# Patient Record
Sex: MALE | Race: WHITE
[De-identification: names, ages, dates, MRNs, and addresses within clinical notes are randomized per-mention and may not be internally consistent; named-entity substitution may affect disease eponyms.]

---

## 2017-12-31 ENCOUNTER — TRANSCRIPTION ENCOUNTER (OUTPATIENT)
Age: 57
End: 2017-12-31

## 2019-10-10 ENCOUNTER — EMERGENCY (EMERGENCY)
Facility: HOSPITAL | Age: 59
LOS: 0 days | Discharge: HOME | End: 2019-10-11
Attending: EMERGENCY MEDICINE | Admitting: EMERGENCY MEDICINE
Payer: MEDICARE

## 2019-10-10 VITALS
HEIGHT: 71 IN | WEIGHT: 279.99 LBS | SYSTOLIC BLOOD PRESSURE: 192 MMHG | DIASTOLIC BLOOD PRESSURE: 98 MMHG | TEMPERATURE: 97 F | RESPIRATION RATE: 18 BRPM | OXYGEN SATURATION: 99 % | HEART RATE: 76 BPM

## 2019-10-10 DIAGNOSIS — I10 ESSENTIAL (PRIMARY) HYPERTENSION: ICD-10-CM

## 2019-10-10 DIAGNOSIS — K21.9 GASTRO-ESOPHAGEAL REFLUX DISEASE WITHOUT ESOPHAGITIS: ICD-10-CM

## 2019-10-10 DIAGNOSIS — R79.89 OTHER SPECIFIED ABNORMAL FINDINGS OF BLOOD CHEMISTRY: ICD-10-CM

## 2019-10-10 DIAGNOSIS — R07.89 OTHER CHEST PAIN: ICD-10-CM

## 2019-10-10 DIAGNOSIS — E78.5 HYPERLIPIDEMIA, UNSPECIFIED: ICD-10-CM

## 2019-10-10 DIAGNOSIS — R10.11 RIGHT UPPER QUADRANT PAIN: ICD-10-CM

## 2019-10-10 DIAGNOSIS — R61 GENERALIZED HYPERHIDROSIS: ICD-10-CM

## 2019-10-10 DIAGNOSIS — R07.9 CHEST PAIN, UNSPECIFIED: ICD-10-CM

## 2019-10-10 LAB
ALBUMIN SERPL ELPH-MCNC: 4.7 G/DL — SIGNIFICANT CHANGE UP (ref 3.5–5.2)
ALP SERPL-CCNC: 85 U/L — SIGNIFICANT CHANGE UP (ref 30–115)
ALT FLD-CCNC: 178 U/L — HIGH (ref 0–41)
ANION GAP SERPL CALC-SCNC: 15 MMOL/L — HIGH (ref 7–14)
AST SERPL-CCNC: 100 U/L — HIGH (ref 0–41)
BILIRUB SERPL-MCNC: 0.6 MG/DL — SIGNIFICANT CHANGE UP (ref 0.2–1.2)
BUN SERPL-MCNC: 14 MG/DL — SIGNIFICANT CHANGE UP (ref 10–20)
CALCIUM SERPL-MCNC: 9.9 MG/DL — SIGNIFICANT CHANGE UP (ref 8.5–10.1)
CHLORIDE SERPL-SCNC: 102 MMOL/L — SIGNIFICANT CHANGE UP (ref 98–110)
CO2 SERPL-SCNC: 24 MMOL/L — SIGNIFICANT CHANGE UP (ref 17–32)
CREAT SERPL-MCNC: 1 MG/DL — SIGNIFICANT CHANGE UP (ref 0.7–1.5)
GLUCOSE SERPL-MCNC: 92 MG/DL — SIGNIFICANT CHANGE UP (ref 70–99)
HCT VFR BLD CALC: 41.6 % — LOW (ref 42–52)
HGB BLD-MCNC: 14.2 G/DL — SIGNIFICANT CHANGE UP (ref 14–18)
LIDOCAIN IGE QN: 47 U/L — SIGNIFICANT CHANGE UP (ref 7–60)
MCHC RBC-ENTMCNC: 34.1 G/DL — SIGNIFICANT CHANGE UP (ref 32–37)
MCHC RBC-ENTMCNC: 34.1 PG — HIGH (ref 27–31)
MCV RBC AUTO: 100 FL — HIGH (ref 80–94)
NRBC # BLD: 0 /100 WBCS — SIGNIFICANT CHANGE UP (ref 0–0)
PLATELET # BLD AUTO: 188 K/UL — SIGNIFICANT CHANGE UP (ref 130–400)
POTASSIUM SERPL-MCNC: 5.1 MMOL/L — HIGH (ref 3.5–5)
POTASSIUM SERPL-SCNC: 5.1 MMOL/L — HIGH (ref 3.5–5)
PROT SERPL-MCNC: 7.7 G/DL — SIGNIFICANT CHANGE UP (ref 6–8)
RBC # BLD: 4.16 M/UL — LOW (ref 4.7–6.1)
RBC # FLD: 12.7 % — SIGNIFICANT CHANGE UP (ref 11.5–14.5)
SODIUM SERPL-SCNC: 141 MMOL/L — SIGNIFICANT CHANGE UP (ref 135–146)
TROPONIN T SERPL-MCNC: <0.01 NG/ML — SIGNIFICANT CHANGE UP
TROPONIN T SERPL-MCNC: <0.01 NG/ML — SIGNIFICANT CHANGE UP
WBC # BLD: 8.56 K/UL — SIGNIFICANT CHANGE UP (ref 4.8–10.8)
WBC # FLD AUTO: 8.56 K/UL — SIGNIFICANT CHANGE UP (ref 4.8–10.8)

## 2019-10-10 PROCEDURE — 99285 EMERGENCY DEPT VISIT HI MDM: CPT

## 2019-10-10 PROCEDURE — 93010 ELECTROCARDIOGRAM REPORT: CPT | Mod: 76

## 2019-10-10 PROCEDURE — 76705 ECHO EXAM OF ABDOMEN: CPT | Mod: 26

## 2019-10-10 PROCEDURE — 71046 X-RAY EXAM CHEST 2 VIEWS: CPT | Mod: 26

## 2019-10-10 RX ORDER — ASPIRIN/CALCIUM CARB/MAGNESIUM 324 MG
81 TABLET ORAL ONCE
Refills: 0 | Status: COMPLETED | OUTPATIENT
Start: 2019-10-10 | End: 2019-10-10

## 2019-10-10 RX ORDER — PANTOPRAZOLE SODIUM 20 MG/1
40 TABLET, DELAYED RELEASE ORAL ONCE
Refills: 0 | Status: COMPLETED | OUTPATIENT
Start: 2019-10-10 | End: 2019-10-10

## 2019-10-10 RX ORDER — FAMOTIDINE 10 MG/ML
20 INJECTION INTRAVENOUS ONCE
Refills: 0 | Status: COMPLETED | OUTPATIENT
Start: 2019-10-10 | End: 2019-10-10

## 2019-10-10 RX ADMIN — Medication 81 MILLIGRAM(S): at 18:55

## 2019-10-10 RX ADMIN — PANTOPRAZOLE SODIUM 40 MILLIGRAM(S): 20 TABLET, DELAYED RELEASE ORAL at 21:02

## 2019-10-10 RX ADMIN — FAMOTIDINE 20 MILLIGRAM(S): 10 INJECTION INTRAVENOUS at 20:34

## 2019-10-10 NOTE — ED PROVIDER NOTE - OBJECTIVE STATEMENT
58 yold male to Ed Pmhx Htn, Hld c/o moderate chest pain started this afternoon described as tightness lasting for few hrs with mild diaphoreisis; pt denies n/v/sob, neck or back pain; pt's last stress test 3 yrs ago - inconclusive due to elevated bp; pt did not f/u for futher testing; pt denies smoking, dm, fmhx cad; deneis recent travel

## 2019-10-10 NOTE — ED PROVIDER NOTE - PHYSICAL EXAMINATION
Constitutional: Well developed, well nourished. NAD  Head: Normocephalic, atraumatic.  Eyes: PERRL, EOMI.  ENT: No nasal discharge. Mucous membranes dry.  Neck: Supple. Painless ROM.  Cardiovascular: Normal S1, S2. Regular rate and rhythm.   Pulmonary: . Lungs clear to auscultation bilaterally.   Abdominal: Soft. Nondistended. No rebound, guarding, rigidity.  Extremities. Pelvis stable. No lower extremity edema, symmetric calves.  Skin: No rashes, cyanosis.  Neuro: AAOx3. No focal neurological deficits.  Psych: Normal mood. Normal affect.

## 2019-10-10 NOTE — ED CDU PROVIDER INITIAL DAY NOTE - ATTENDING CONTRIBUTION TO CARE
59 yo M gerd, htn, etoh abuse daily, previously seen by dr barahona, cardiology, now with R sided cp assoc with diaph. no sob, he also has intermittent RUQ pain.  US was done in the ED - there's a right hepatic lobe mass, no gallstones. pancreas nl.  labs reviewed. pt is daily drinker.  vss, nontoxic, well appearing, pink conj, anicteric, MMM, neck supple, CTAB, RRR, equal radial pulses bilat, abd soft/nt/nd, no cva tend. no calves tend, no edema, no fnd. no rashes.

## 2019-10-10 NOTE — ED CDU PROVIDER INITIAL DAY NOTE - GASTROINTESTINAL NEGATIVE STATEMENT, MLM
no abdominal pain, no bloating, no constipation, no diarrhea, no nausea and no vomiting. + abdominal pain, no bloating, no constipation, no diarrhea, no nausea and no vomiting.

## 2019-10-10 NOTE — ED PROVIDER NOTE - CLINICAL SUMMARY MEDICAL DECISION MAKING FREE TEXT BOX
Pt here for CP. Concerned for ACS so sent labs, images, ASA. Pt with elevated LFTs requiring US. Will be placed in EDOU for further ACS workup.

## 2019-10-10 NOTE — ED PROVIDER NOTE - CARE PLAN
Principal Discharge DX:	Chest pain Principal Discharge DX:	Chest pain  Secondary Diagnosis:	Elevated LFTs

## 2019-10-10 NOTE — ED CDU PROVIDER INITIAL DAY NOTE - OBJECTIVE STATEMENT
57y/o male with pmh of gerd, htn, hld, pt. c/o right sided cp associated with diaphoresis which happened at rest earlier today. now pain free. denies fever, chills, cough, nausea, vomiting, sob, abdominal pain. not a smoker. cardiologist is dr. Aly. 59y/o male with pmh of gerd, htn, hld, pt. c/o right sided cp associated with diaphoresis which happened at rest earlier today. now pain free. denies fever, chills, cough, nausea, vomiting, sob, + intermittent ruq abdominal pain. not a smoker. cardiologist is dr. Aly.

## 2019-10-10 NOTE — ED PROVIDER NOTE - ATTENDING CONTRIBUTION TO CARE
59 yo m with pmh of htn, hld, presents with c/o chest pain since 2pm.  pt described as tightness.  +diaphoresis, no n/v.  no radiation.  pt had treadmill stress test 3 yrs ago, but was aborted due to high bp with dr. shah.  according to pt, he was cleared at the time despite incomplete stress test.  no fever, no chills.  exam: nad, ncat, perrl, eomi, mmm, rrr, ctab, abd soft, mildly ttp ruq and epig, no rebound, no guarding, obese, aox3 imp: pt with chest pain, ekg, cxr, labs, US for mildly elevated lfts and abd tenderness, agreeable to obs status

## 2019-10-11 VITALS
TEMPERATURE: 96 F | OXYGEN SATURATION: 99 % | RESPIRATION RATE: 18 BRPM | SYSTOLIC BLOOD PRESSURE: 175 MMHG | DIASTOLIC BLOOD PRESSURE: 94 MMHG

## 2019-10-11 PROCEDURE — 99218: CPT

## 2019-10-11 PROCEDURE — 93018 CV STRESS TEST I&R ONLY: CPT

## 2019-10-11 PROCEDURE — 93016 CV STRESS TEST SUPVJ ONLY: CPT

## 2019-10-11 PROCEDURE — 78452 HT MUSCLE IMAGE SPECT MULT: CPT | Mod: 26

## 2019-10-11 NOTE — ED ADULT NURSE REASSESSMENT NOTE - NS ED NURSE REASSESS COMMENT FT1
Pt assessed A/O times 45 Vs stable on cardiac monitor denies chest pain no SOB no N/V no dizziness ambulate steady ,pt is seen evaluate by Ed attending waiting for NM stress test ,on going nursing observation .

## 2019-10-11 NOTE — ED CDU PROVIDER DISPOSITION NOTE - CARE PROVIDER_API CALL
Shashi Newberry)  Cardiovascular Disease  [address] [place] [address]  Phone: [phone]  Fax: [phone]  Follow Up Time:

## 2019-10-11 NOTE — ED CDU PROVIDER DISPOSITION NOTE - PATIENT PORTAL LINK FT
You can access the FollowMyHealth Patient Portal offered by [facility] by registering at the following website: [email or web address]. By joining [facility]’s FollowMyHealth portal, you will also be able to view your health information using other applications (apps) compatible with our system.

## 2019-10-11 NOTE — ED CDU PROVIDER SUBSEQUENT DAY NOTE - PROGRESS NOTE DETAILS
pt. in no distress, remains on cardiac monitor, trops wnl, will continue to reassess. Pt seen resting comfortably at bedside. Currently denies any pain. neg enzymes/ekg x 2. Awaiting nuclear stress test

## 2019-10-11 NOTE — ED ADULT NURSE REASSESSMENT NOTE - NS ED NURSE REASSESS COMMENT FT1
Pt reassessed remain comfortable no pain, no SOB ,no N/V cleaned dry , VS stable  is seen evaluate by Ed attending clear to be D/C to nursing home ready to be D/C with EMS  NAD noted .

## 2019-10-11 NOTE — ED CDU PROVIDER DISPOSITION NOTE - CLINICAL COURSE
Patient observed in EDOU.  no ectopy on the monitor. CEx2 negative. EKG x2 no ischemia. Stress test normal. Will dc with f/u.

## 2019-12-03 ENCOUNTER — TRANSCRIPTION ENCOUNTER (OUTPATIENT)
Age: 59
End: 2019-12-03

## 2020-07-26 ENCOUNTER — TRANSCRIPTION ENCOUNTER (OUTPATIENT)
Age: 60
End: 2020-07-26

## 2020-12-10 ENCOUNTER — TRANSCRIPTION ENCOUNTER (OUTPATIENT)
Age: 60
End: 2020-12-10

## 2021-09-18 ENCOUNTER — TRANSCRIPTION ENCOUNTER (OUTPATIENT)
Age: 61
End: 2021-09-18

## 2021-10-07 ENCOUNTER — TRANSCRIPTION ENCOUNTER (OUTPATIENT)
Age: 61
End: 2021-10-07

## 2021-11-15 ENCOUNTER — TRANSCRIPTION ENCOUNTER (OUTPATIENT)
Age: 61
End: 2021-11-15

## 2022-03-10 ENCOUNTER — TRANSCRIPTION ENCOUNTER (OUTPATIENT)
Age: 62
End: 2022-03-10

## 2022-06-21 ENCOUNTER — NON-APPOINTMENT (OUTPATIENT)
Age: 62
End: 2022-06-21

## 2022-06-23 PROBLEM — I10 ESSENTIAL (PRIMARY) HYPERTENSION: Chronic | Status: ACTIVE | Noted: 2019-10-10

## 2022-08-04 ENCOUNTER — APPOINTMENT (OUTPATIENT)
Dept: ORTHOPEDIC SURGERY | Facility: CLINIC | Age: 62
End: 2022-08-04

## 2022-08-11 ENCOUNTER — NON-APPOINTMENT (OUTPATIENT)
Age: 62
End: 2022-08-11

## 2022-10-18 ENCOUNTER — NON-APPOINTMENT (OUTPATIENT)
Age: 62
End: 2022-10-18

## 2022-10-31 ENCOUNTER — NON-APPOINTMENT (OUTPATIENT)
Age: 62
End: 2022-10-31

## 2022-11-14 ENCOUNTER — NON-APPOINTMENT (OUTPATIENT)
Age: 62
End: 2022-11-14

## 2022-11-29 ENCOUNTER — NON-APPOINTMENT (OUTPATIENT)
Age: 62
End: 2022-11-29

## 2023-01-04 ENCOUNTER — EMERGENCY (EMERGENCY)
Facility: HOSPITAL | Age: 63
LOS: 0 days | Discharge: HOME | End: 2023-01-05
Admitting: EMERGENCY MEDICINE

## 2023-01-04 ENCOUNTER — INPATIENT (INPATIENT)
Facility: HOSPITAL | Age: 63
LOS: 0 days | Discharge: HOME | End: 2023-01-05
Attending: HOSPITALIST | Admitting: HOSPITALIST
Payer: MEDICARE

## 2023-01-04 VITALS
WEIGHT: 289.91 LBS | RESPIRATION RATE: 16 BRPM | SYSTOLIC BLOOD PRESSURE: 176 MMHG | HEART RATE: 66 BPM | OXYGEN SATURATION: 99 % | DIASTOLIC BLOOD PRESSURE: 84 MMHG | TEMPERATURE: 97 F

## 2023-01-04 DIAGNOSIS — R00.2 PALPITATIONS: ICD-10-CM

## 2023-01-04 DIAGNOSIS — R06.02 SHORTNESS OF BREATH: ICD-10-CM

## 2023-01-04 DIAGNOSIS — I10 ESSENTIAL (PRIMARY) HYPERTENSION: ICD-10-CM

## 2023-01-04 DIAGNOSIS — Z20.822 CONTACT WITH AND (SUSPECTED) EXPOSURE TO COVID-19: ICD-10-CM

## 2023-01-04 DIAGNOSIS — K21.9 GASTRO-ESOPHAGEAL REFLUX DISEASE WITHOUT ESOPHAGITIS: ICD-10-CM

## 2023-01-04 DIAGNOSIS — R07.9 CHEST PAIN, UNSPECIFIED: ICD-10-CM

## 2023-01-04 DIAGNOSIS — I16.1 HYPERTENSIVE EMERGENCY: ICD-10-CM

## 2023-01-04 DIAGNOSIS — I25.10 ATHEROSCLEROTIC HEART DISEASE OF NATIVE CORONARY ARTERY WITHOUT ANGINA PECTORIS: ICD-10-CM

## 2023-01-04 DIAGNOSIS — R07.89 OTHER CHEST PAIN: ICD-10-CM

## 2023-01-04 DIAGNOSIS — I25.118 ATHEROSCLEROTIC HEART DISEASE OF NATIVE CORONARY ARTERY WITH OTHER FORMS OF ANGINA PECTORIS: ICD-10-CM

## 2023-01-04 DIAGNOSIS — R60.0 LOCALIZED EDEMA: ICD-10-CM

## 2023-01-04 LAB
ALBUMIN SERPL ELPH-MCNC: 4.7 G/DL — SIGNIFICANT CHANGE UP (ref 3.5–5.2)
ALP SERPL-CCNC: 94 U/L — SIGNIFICANT CHANGE UP (ref 30–115)
ALT FLD-CCNC: 86 U/L — HIGH (ref 0–41)
ANION GAP SERPL CALC-SCNC: 14 MMOL/L — SIGNIFICANT CHANGE UP (ref 7–14)
AST SERPL-CCNC: 59 U/L — HIGH (ref 0–41)
BASOPHILS # BLD AUTO: 0.06 K/UL — SIGNIFICANT CHANGE UP (ref 0–0.2)
BASOPHILS NFR BLD AUTO: 0.6 % — SIGNIFICANT CHANGE UP (ref 0–1)
BILIRUB SERPL-MCNC: 0.7 MG/DL — SIGNIFICANT CHANGE UP (ref 0.2–1.2)
BUN SERPL-MCNC: 14 MG/DL — SIGNIFICANT CHANGE UP (ref 10–20)
CALCIUM SERPL-MCNC: 9.9 MG/DL — SIGNIFICANT CHANGE UP (ref 8.4–10.5)
CHLORIDE SERPL-SCNC: 106 MMOL/L — SIGNIFICANT CHANGE UP (ref 98–110)
CO2 SERPL-SCNC: 23 MMOL/L — SIGNIFICANT CHANGE UP (ref 17–32)
CREAT SERPL-MCNC: 1 MG/DL — SIGNIFICANT CHANGE UP (ref 0.7–1.5)
EGFR: 85 ML/MIN/1.73M2 — SIGNIFICANT CHANGE UP
EOSINOPHIL # BLD AUTO: 0.22 K/UL — SIGNIFICANT CHANGE UP (ref 0–0.7)
EOSINOPHIL NFR BLD AUTO: 2.3 % — SIGNIFICANT CHANGE UP (ref 0–8)
GLUCOSE SERPL-MCNC: 98 MG/DL — SIGNIFICANT CHANGE UP (ref 70–99)
HCT VFR BLD CALC: 44.5 % — SIGNIFICANT CHANGE UP (ref 42–52)
HGB BLD-MCNC: 15.5 G/DL — SIGNIFICANT CHANGE UP (ref 14–18)
IMM GRANULOCYTES NFR BLD AUTO: 0.3 % — SIGNIFICANT CHANGE UP (ref 0.1–0.3)
LYMPHOCYTES # BLD AUTO: 1.53 K/UL — SIGNIFICANT CHANGE UP (ref 1.2–3.4)
LYMPHOCYTES # BLD AUTO: 16.1 % — LOW (ref 20.5–51.1)
MAGNESIUM SERPL-MCNC: 1.7 MG/DL — LOW (ref 1.8–2.4)
MCHC RBC-ENTMCNC: 34.5 PG — HIGH (ref 27–31)
MCHC RBC-ENTMCNC: 34.8 G/DL — SIGNIFICANT CHANGE UP (ref 32–37)
MCV RBC AUTO: 99.1 FL — HIGH (ref 80–94)
MONOCYTES # BLD AUTO: 0.98 K/UL — HIGH (ref 0.1–0.6)
MONOCYTES NFR BLD AUTO: 10.3 % — HIGH (ref 1.7–9.3)
NEUTROPHILS # BLD AUTO: 6.67 K/UL — HIGH (ref 1.4–6.5)
NEUTROPHILS NFR BLD AUTO: 70.4 % — SIGNIFICANT CHANGE UP (ref 42.2–75.2)
NRBC # BLD: 0 /100 WBCS — SIGNIFICANT CHANGE UP (ref 0–0)
NT-PROBNP SERPL-SCNC: 32 PG/ML — SIGNIFICANT CHANGE UP (ref 0–300)
PLATELET # BLD AUTO: 191 K/UL — SIGNIFICANT CHANGE UP (ref 130–400)
POTASSIUM SERPL-MCNC: 4.6 MMOL/L — SIGNIFICANT CHANGE UP (ref 3.5–5)
POTASSIUM SERPL-SCNC: 4.6 MMOL/L — SIGNIFICANT CHANGE UP (ref 3.5–5)
PROT SERPL-MCNC: 7.7 G/DL — SIGNIFICANT CHANGE UP (ref 6–8)
RBC # BLD: 4.49 M/UL — LOW (ref 4.7–6.1)
RBC # FLD: 12.7 % — SIGNIFICANT CHANGE UP (ref 11.5–14.5)
SARS-COV-2 RNA SPEC QL NAA+PROBE: SIGNIFICANT CHANGE UP
SODIUM SERPL-SCNC: 143 MMOL/L — SIGNIFICANT CHANGE UP (ref 135–146)
TROPONIN T SERPL-MCNC: <0.01 NG/ML — SIGNIFICANT CHANGE UP
TROPONIN T SERPL-MCNC: <0.01 NG/ML — SIGNIFICANT CHANGE UP
WBC # BLD: 9.49 K/UL — SIGNIFICANT CHANGE UP (ref 4.8–10.8)
WBC # FLD AUTO: 9.49 K/UL — SIGNIFICANT CHANGE UP (ref 4.8–10.8)

## 2023-01-04 PROCEDURE — 99223 1ST HOSP IP/OBS HIGH 75: CPT | Mod: FS,CS

## 2023-01-04 PROCEDURE — 71045 X-RAY EXAM CHEST 1 VIEW: CPT | Mod: 26

## 2023-01-04 PROCEDURE — 93010 ELECTROCARDIOGRAM REPORT: CPT | Mod: 76

## 2023-01-04 RX ORDER — REGADENOSON 0.08 MG/ML
0.4 INJECTION, SOLUTION INTRAVENOUS ONCE
Refills: 0 | Status: DISCONTINUED | OUTPATIENT
Start: 2023-01-04 | End: 2023-01-04

## 2023-01-04 RX ORDER — VALSARTAN 80 MG/1
320 TABLET ORAL DAILY
Refills: 0 | Status: DISCONTINUED | OUTPATIENT
Start: 2023-01-04 | End: 2023-01-05

## 2023-01-04 RX ORDER — MAGNESIUM SULFATE 500 MG/ML
2 VIAL (ML) INJECTION ONCE
Refills: 0 | Status: COMPLETED | OUTPATIENT
Start: 2023-01-04 | End: 2023-01-04

## 2023-01-04 RX ORDER — ATENOLOL 25 MG/1
50 TABLET ORAL DAILY
Refills: 0 | Status: DISCONTINUED | OUTPATIENT
Start: 2023-01-04 | End: 2023-01-05

## 2023-01-04 RX ADMIN — Medication 25 GRAM(S): at 18:02

## 2023-01-04 RX ADMIN — Medication 2 GRAM(S): at 19:02

## 2023-01-04 NOTE — ED PROVIDER NOTE - CLINICAL SUMMARY MEDICAL DECISION MAKING FREE TEXT BOX
62-year-old male with h/o HTN, GERD, in ER with c/o episode of chest tightness, SOB, diaphoresis -now resolved.  EKG with no acute ischemic changes.  Troponin negative.  Patient placed in EDOU for further cardiac evaluation.

## 2023-01-04 NOTE — ED PROCEDURE NOTE - NS ED ATTENDING STATEMENT MOD
This was a shared visit with the CÉSAR. I reviewed and verified the documentation and independently performed the documented:

## 2023-01-04 NOTE — ED ADULT NURSE REASSESSMENT NOTE - NS ED NURSE REASSESS COMMENT FT1
pt placed in OBS, denies pain/discomfort at this time. VSS, pt awaiting stress test in AM, will cont to monitor.

## 2023-01-04 NOTE — ED PROVIDER NOTE - PHYSICAL EXAMINATION
CONSTITUTIONAL: Well-developed; well-nourished; in no acute distress.   SKIN: Warm, dry  HEAD: Normocephalic; atraumatic  EYES: PERRL, EOMI, normal sclera and conjunctiva   ENT: No nasal discharge; airway clear.  CARD:  Regular rate and rhythm. Normal S1, S2. 2+ distal pulses.  RESP: No increased WOB. CTA b/l without wheezes, crackles, rhonchi  ABD: Normoactive BS. Soft, nontender, nondistended.  EXT: Normal ROM. B/L 2+ pitting edema. Equal calf sizes, nontender.   NEURO: Alert, oriented, grossly unremarkable  PSYCH: Cooperative, appropriate.

## 2023-01-04 NOTE — ED ADULT NURSE REASSESSMENT NOTE - NS ED NURSE REASSESS COMMENT FT1
pt denies pain/discomfort at this time. IVL inact, cardiac monitor in place. Safety and comfort measures in place. will cont to monitor.

## 2023-01-04 NOTE — ED CDU PROVIDER INITIAL DAY NOTE - CLINICAL SUMMARY MEDICAL DECISION MAKING FREE TEXT BOX
61 y/o male with h/o htn, gerd, in ER with c/o CP.  Pt states he was walking up stairs and felt very winded, like his heart was racing and developed chest tightness and felt SOB.  + diaphoresis.  no n/v.   Sat down and rested and symptoms slowly subsided.  has noted some decreased exercise tolerance recently.  no cough.  no abd pain.  + b/l LE edema, states he's had this for several years and is not new.  no ha/dizziness/syncope.  has seen a card previously and was supposed to do an exercise stress test, but was unable to complete 2/2 his BP being too elevated at the time.  has not had any other cardiac testing.  non-smoker.  PE - nad, nc/at, eomi, perrl, op - clear, mmm, cta b/l, no w/r/r, rrr, abd  - soft, nt/nd, nabs, from x 4, + b/l LE edema, no calf tenderness, A&O x 3, no focal neuro deficits.  -62-year-old male with h/o HTN, GERD, in ER with c/o episode of chest tightness, SOB, diaphoresis -now resolved.  EKG with no acute ischemic changes.  Troponin negative.  Patient placed in EDOU for further cardiac evaluation.

## 2023-01-04 NOTE — ED PROVIDER NOTE - OBJECTIVE STATEMENT
61 y/o M with PMH HTN, GERD presenting for palpitations and sweating that started at approx. 10am while sitting in doctor's office with his wife (wife was the pt). A/w SOB; notes he had just walked up a flight of stairs prior to onset of symptoms. Notes he has had increasingly worsening SOB with exertion over the past few weeks.  Previously saw Francoise years ago; started stress test which was stopped for uncontrolled htn.  Nonsmoker. No family hx heart disease 63 y/o M with PMH HTN, GERD presenting for palpitations and sweating that started at approx. 10am while sitting in doctor's office with his wife (wife was the pt). A/w SOB; notes he had just walked up a flight of stairs prior to onset of symptoms. Notes he has had increasingly worsening SOB with exertion over the past few weeks. Feels better now but feels slight chest tightness. Previously saw Francoise years ago; started stress test which was stopped for uncontrolled htn. Denies back pain, N/V, abd pain, fever. Nonsmoker. No family hx heart disease. No recent travel or surgery. No hormonal meds. No familial hx of clotting disorders.

## 2023-01-04 NOTE — ED PROVIDER NOTE - ATTENDING CONTRIBUTION TO CARE
63 y/o male with h/o htn, gerd, in ER with c/o CP.  Pt states he was walking up stairs and felt very winded, like his heart was racing and developed chest tightness and felt SOB.  + diaphoresis.  no n/v.   Sat down and rested and symptoms slowly subsided.  has noted some decreased exercise tolerance recently.  no cough.  no abd pain.  + b/l LE edema, states he's had this for several years and is not new.  no ha/dizziness/syncope.  has seen a card previously and was supposed to do an exercise stress test, but was unable to complete 2/2 his BP being too elevated at the time.  has not had any other cardiac testing.  non-smoker.  PE - nad, nc/at, eomi, perrl, op - clear, mmm, cta b/l, no w/r/r, rrr, abd  - soft, nt/nd, nabs, from x 4, + b/l LE edema, no calf tenderness, A&O x 3, no focal neuro deficits.  -cardiac w/u.

## 2023-01-04 NOTE — ED CDU PROVIDER INITIAL DAY NOTE - NS ED ROS FT
CONST: No fever, chills or bodyaches  EYES: No pain, redness, drainage or visual changes.  ENT: No ear pain or discharge, nasal discharge or congestion. No sore throat  CARD: positive chest pain, palpitations  RESP: positive SOB, no cough, hemoptysis. No hx of asthma or COPD  GI: No abdominal pain, N/V/D  MS: No joint pain, back pain or extremity pain/injury  SKIN: No rashes  NEURO: No headache, dizziness, paresthesias or LOC

## 2023-01-04 NOTE — ED CDU PROVIDER INITIAL DAY NOTE - OBJECTIVE STATEMENT
61 y/o M with PMH HTN, GERD presenting for palpitations and sweating that started at approx. 10am while sitting in doctor's office with his wife (wife was the pt). A/w SOB; notes he had just walked up a flight of stairs prior to onset of symptoms. Notes he has had increasingly worsening SOB with exertion over the past few weeks. Feels better now but feels slight chest tightness. Previously saw Francoise years ago; started stress test which was stopped for uncontrolled htn. Denies back pain, N/V, abd pain, fever. Nonsmoker. No family hx heart disease. No recent travel or surgery. No hormonal meds. No familial hx of clotting disorders.

## 2023-01-05 ENCOUNTER — TRANSCRIPTION ENCOUNTER (OUTPATIENT)
Age: 63
End: 2023-01-05

## 2023-01-05 VITALS
RESPIRATION RATE: 18 BRPM | OXYGEN SATURATION: 98 % | HEART RATE: 60 BPM | DIASTOLIC BLOOD PRESSURE: 84 MMHG | SYSTOLIC BLOOD PRESSURE: 177 MMHG

## 2023-01-05 PROCEDURE — 93010 ELECTROCARDIOGRAM REPORT: CPT

## 2023-01-05 PROCEDURE — G1004: CPT

## 2023-01-05 PROCEDURE — 99238 HOSP IP/OBS DSCHRG MGMT 30/<: CPT

## 2023-01-05 PROCEDURE — 93458 L HRT ARTERY/VENTRICLE ANGIO: CPT | Mod: 26

## 2023-01-05 PROCEDURE — 99236 HOSP IP/OBS SAME DATE HI 85: CPT

## 2023-01-05 PROCEDURE — 75574 CT ANGIO HRT W/3D IMAGE: CPT | Mod: 26,MF

## 2023-01-05 PROCEDURE — 99497 ADVNCD CARE PLAN 30 MIN: CPT | Mod: 25

## 2023-01-05 RX ORDER — HEPARIN SODIUM 5000 [USP'U]/ML
5000 INJECTION INTRAVENOUS; SUBCUTANEOUS EVERY 12 HOURS
Refills: 0 | Status: DISCONTINUED | OUTPATIENT
Start: 2023-01-05 | End: 2023-01-05

## 2023-01-05 RX ORDER — ATORVASTATIN CALCIUM 80 MG/1
1 TABLET, FILM COATED ORAL
Qty: 30 | Refills: 2
Start: 2023-01-05 | End: 2023-04-04

## 2023-01-05 RX ORDER — SODIUM CHLORIDE 9 MG/ML
1000 INJECTION INTRAMUSCULAR; INTRAVENOUS; SUBCUTANEOUS
Refills: 0 | Status: DISCONTINUED | OUTPATIENT
Start: 2023-01-05 | End: 2023-01-05

## 2023-01-05 RX ORDER — SODIUM CHLORIDE 9 MG/ML
1000 INJECTION INTRAMUSCULAR; INTRAVENOUS; SUBCUTANEOUS ONCE
Refills: 0 | Status: COMPLETED | OUTPATIENT
Start: 2023-01-05 | End: 2023-01-05

## 2023-01-05 RX ORDER — PANTOPRAZOLE SODIUM 20 MG/1
40 TABLET, DELAYED RELEASE ORAL
Refills: 0 | Status: DISCONTINUED | OUTPATIENT
Start: 2023-01-05 | End: 2023-01-05

## 2023-01-05 RX ORDER — ASPIRIN/CALCIUM CARB/MAGNESIUM 324 MG
1 TABLET ORAL
Qty: 30 | Refills: 2
Start: 2023-01-05 | End: 2023-04-04

## 2023-01-05 RX ORDER — HYDRALAZINE HCL 50 MG
10 TABLET ORAL ONCE
Refills: 0 | Status: COMPLETED | OUTPATIENT
Start: 2023-01-05 | End: 2023-01-05

## 2023-01-05 RX ORDER — METOPROLOL TARTRATE 50 MG
100 TABLET ORAL ONCE
Refills: 0 | Status: COMPLETED | OUTPATIENT
Start: 2023-01-05 | End: 2023-01-05

## 2023-01-05 RX ORDER — ASPIRIN/CALCIUM CARB/MAGNESIUM 324 MG
324 TABLET ORAL ONCE
Refills: 0 | Status: COMPLETED | OUTPATIENT
Start: 2023-01-05 | End: 2023-01-05

## 2023-01-05 RX ORDER — ATORVASTATIN CALCIUM 80 MG/1
40 TABLET, FILM COATED ORAL AT BEDTIME
Refills: 0 | Status: DISCONTINUED | OUTPATIENT
Start: 2023-01-05 | End: 2023-01-05

## 2023-01-05 RX ORDER — ASPIRIN/CALCIUM CARB/MAGNESIUM 324 MG
81 TABLET ORAL DAILY
Refills: 0 | Status: DISCONTINUED | OUTPATIENT
Start: 2023-01-06 | End: 2023-01-05

## 2023-01-05 RX ADMIN — Medication 100 MILLIGRAM(S): at 06:22

## 2023-01-05 RX ADMIN — ATENOLOL 50 MILLIGRAM(S): 25 TABLET ORAL at 06:22

## 2023-01-05 RX ADMIN — SODIUM CHLORIDE 100 MILLILITER(S): 9 INJECTION INTRAMUSCULAR; INTRAVENOUS; SUBCUTANEOUS at 12:24

## 2023-01-05 RX ADMIN — VALSARTAN 320 MILLIGRAM(S): 80 TABLET ORAL at 06:22

## 2023-01-05 RX ADMIN — Medication 324 MILLIGRAM(S): at 13:07

## 2023-01-05 RX ADMIN — SODIUM CHLORIDE 1000 MILLILITER(S): 9 INJECTION INTRAMUSCULAR; INTRAVENOUS; SUBCUTANEOUS at 12:00

## 2023-01-05 RX ADMIN — Medication 10 MILLIGRAM(S): at 13:06

## 2023-01-05 NOTE — CHART NOTE - NSCHARTNOTEFT_GEN_A_CORE
PRE-OP DIAGNOSIS:  chest pain, positive CCTA    PROCEDURE:   [ ] Coronary Angiogram   [ x] LHC   [ ] LVG   [ ] RHC   [ ] Intervention (see below)       PHYSICIAN:  Dr. Roman  FELLOW: Katelin    PROCEDURE DESCRIPTION:     Consent:    [x] Patient   [] Family Member   []  Used      Anesthesia:   [ ] General   [X] Sedation   [X] Local     Access & Closure:   [ x] 6 Fr Radial Artery  -> D-stat     [ ]  Fr Femoral Artery ->  [ ]  Fr Femoral Vein ->   [ ]  Fr Brachial Vein ->     IV Contrast: 60 mL      Intervention:  none    Implants: none    FINDINGS:   Coronary Dominance: right  LM: minor irregularities  LAD: mild disease. 40% ostial D1   CX: severe distal disease. 40% OM1 stenosis  RCA: mild disease in RPDA. Minor irregularities in remainder    LVEDP:  24 mmHg     EF: not assessed    ESTIMATED BLOOD LOSS: < 10 mL      CONDITION:   [x] Good   [ ] Fair   [ ] Critical     SPECIMEN REMOVED: N/A     POST-OP DIAGNOSIS:    [ ] Normal Coronary Angiogram   [x ] Mild Coronary Artery Disease (< 50% stenosis)      PLAN OF CARE:   [x ] D/C Home Today   [x ] Return to In-patient bed   [ ] Admit for observation   [ ] Return for Staged Procedure in 4-6 weeks   [ ] CT Surgery Consult   [X] Medications: ASA,  statin  [X] IV Fluids: NS @ 100cc/h x 6 hours  [ ] EP Consult  [x] Remove D-stat and Hold manual pressure if signs of hematoma or bleeding over radial access site.  [x] Smoking cessation PRE-OP DIAGNOSIS:  chest pain, positive CCTA    PROCEDURE:   [ ] Coronary Angiogram   [ x] LHC   [ ] LVG   [ ] RHC   [ ] Intervention (see below)       PHYSICIAN:  Dr. Roman  FELLOW: Katelin    PROCEDURE DESCRIPTION:     Consent:    [x] Patient   [] Family Member   []  Used      Anesthesia:   [ ] General   [X] Sedation   [X] Local     Access & Closure:   [ x] 6 Fr Radial Artery  -> D-stat     [ ]  Fr Femoral Artery ->  [ ]  Fr Femoral Vein ->   [ ]  Fr Brachial Vein ->     IV Contrast: 60 mL      Intervention:  none    Implants: none    FINDINGS:   Coronary Dominance: right  LM: minor irregularities  LAD: mild disease. 40% ostial D1   CX: severe distal disease, small vessel. 40% OM1 stenosis  RCA: mild disease in RPDA. Minor irregularities in remainder    LVEDP:  24 mmHg     EF: not assessed    ESTIMATED BLOOD LOSS: < 10 mL      CONDITION:   [x] Good   [ ] Fair   [ ] Critical     SPECIMEN REMOVED: N/A     POST-OP DIAGNOSIS:    [ ] Normal Coronary Angiogram   [x ] Mild Coronary Artery Disease (< 50% stenosis)      PLAN OF CARE:   [x ] D/C Home Today   [x ] Return to In-patient bed   [ ] Admit for observation   [ ] Return for Staged Procedure in 4-6 weeks   [ ] CT Surgery Consult   [X] Medications: ASA,  statin  [X] IV Fluids: NS @ 100cc/h x 6 hours  [ ] EP Consult  [x] Remove D-stat and Hold manual pressure if signs of hematoma or bleeding over radial access site.  [x] Smoking cessation

## 2023-01-05 NOTE — DISCHARGE NOTE NURSING/CASE MANAGEMENT/SOCIAL WORK - NSDCPEFALRISK_GEN_ALL_CORE
For information on Fall & Injury Prevention, visit: [email or web address] OR  [email or web address] OR  https://www.cdc.gov/steadi/patient.html

## 2023-01-05 NOTE — H&P ADULT - CONVERSATION DETAILS
Goals of Care Conversation done with pt. Discussed FULL CODE VS DNR/DNI. CPR and intubation discussed with Pt and agreed to Both. Pt wishes to be FULL code.  All questions answered.

## 2023-01-05 NOTE — DISCHARGE NOTE PROVIDER - HOSPITAL COURSE
HPI:  63 y/o M with PMH HTN, GERD presenting for shortness of breath, palpitations and sweating that started while walking up a flight of stairs prior to onset of symptoms. Notes he has had increasingly worsening chest pain and SOB with exertion over the past few weeks. No current chest pain. Previously saw Francoise years ago; started stress test which was stopped for uncontrolled htn. Denies back pain, N/V, abd pain, fever. Nonsmoker. No family hx heart disease. No recent travel or surgery. No hormonal meds. No familial hx of clotting disorders. In the ED, pt hemodynamically stable, elevated /104 improved s/p metoprolol. Labs wnl Mg 1.7, Trops neg x2  (05 Jan 2023 12:05)    Patient had a CCTA which showed Diminutive left circumflex artery containing a focal area of severe stenosis proximally with nonvisualization distally, which may be due to occlusion. patient also had a cath which showed:  Coronary Dominance: right  LM: minor irregularities  LAD: mild disease. 40% ostial D1   CX: severe distal disease. 40% OM1 stenosis  RCA: mild disease in RPDA. Minor irregularities in remainder    Patient was cleared by the cardiology to be discharged   Patient will have to do a TTE as outpatient        HPI:  63 y/o M with PMH HTN, GERD presenting for shortness of breath, palpitations and sweating that started while walking up a flight of stairs prior to onset of symptoms. Notes he has had increasingly worsening chest pain and SOB with exertion over the past few weeks. No current chest pain. Previously saw Francoise years ago; started stress test which was stopped for uncontrolled htn. Denies back pain, N/V, abd pain, fever. Nonsmoker. No family hx heart disease. No recent travel or surgery. No hormonal meds. No familial hx of clotting disorders. In the ED, pt hemodynamically stable, elevated /104 improved s/p metoprolol. Labs wnl Mg 1.7, Trops neg x2  (05 Jan 2023 12:05)    Patient had a CCTA which showed Diminutive left circumflex artery containing a focal area of severe stenosis proximally with nonvisualization distally, which may be due to occlusion. patient also had a cath which showed:  Coronary Dominance: right  LM: minor irregularities  LAD: mild disease. 40% ostial D1   CX: severe distal disease. 40% OM1 stenosis  RCA: mild disease in RPDA. Minor irregularities in remainder    Patient was cleared by the cardiology to be discharged   Patient will have to do a TTE as outpatient  as per cardiology   Attending note: [facility] cardiology staff, stated that PT bp was in 150s in the cath lab, [facility]

## 2023-01-05 NOTE — DISCHARGE NOTE PROVIDER - PROVIDER TOKENS
PROVIDER:[TOKEN:[[id number]],FOLLOWUP:[1 week]],PROVIDER:[TOKEN:[[id number]],FOLLOWUP:[2 weeks]]

## 2023-01-05 NOTE — ED CDU PROVIDER DISPOSITION NOTE - CLINICAL COURSE
62-year-old male presents to the emergency department complaining of chest pain.  In the emergency department he had screening labs, imaging, cardiac biomarkers, without acute emergent findings and I disposition to ED observation unit for low risk chest pain.  In ED observation unit reevaluation, cardiac biomarkers, repeat EKGs and CCTA.  CCTA revealed high grade CAD RADS score, identified as left circumflex stenotic lesion with questionable lack of flow distally.  Patient was seen by cardiology fellow bedside, no plan for emergent intervention, suggest discussion with PMD cardiologist.  Contact made to PMD cardiologist, pending communication at this time.  Patient clinically well, will disposition to monitored setting as inpatient pending additional feedback and plan from PMD cardiologist.

## 2023-01-05 NOTE — DISCHARGE NOTE PROVIDER - NSDCCPTREATMENT_GEN_ALL_CORE_FT
PRINCIPAL PROCEDURE  Procedure: Transthoracic echocardiography (TTE)  Findings and Treatment:

## 2023-01-05 NOTE — DISCHARGE NOTE PROVIDER - ATTENDING DISCHARGE PHYSICAL EXAMINATION:
Attending attestation  Attending DC note  Pt seen and examined at bedside. No cp or sob  vitals, labs, exam stable  Hospital course as above.  Plan dw pt and agreed to plan  Medically cleared for DC. Med recc completed.  [facility] resident.

## 2023-01-05 NOTE — DISCHARGE NOTE PROVIDER - NSDCCPCAREPLAN_GEN_ALL_CORE_FT
PRINCIPAL DISCHARGE DIAGNOSIS  Diagnosis: Chest pain  Assessment and Plan of Treatment: Chest pain can be caused by many different conditions which may or may not be dangerous. Causes include heartburn, lung infections, heart attack, blood clot in lungs, skin infections, strain or damage to muscle, cartilage, or bones, etc. In addition to a history and physical examination, an electrocardiogram (ECG) or other lab tests may have been performed to determine the cause of your chest pain. Follow up with your primary care provider or with a cardiologist as instructed.   You had a catheterization done, which showed no occlusion of the arteries. Please take your medications as prescribed and followup with Dr. Benavides and with your primary care physician as instructed   SEEK IMMEDIATE MEDICAL CARE IF YOU HAVE ANY OF THE FOLLOWING SYMPTOMS: worsening chest pain, coughing up blood, unexplained back/neck/jaw pain, severe abdominal pain, dizziness or lightheadedness, fainting, shortness of breath, sweaty or clammy skin, vomiting, or racing heart beat. These symptoms may represent a serious problem that is an emergency. Do not wait to see if the symptoms will go away. Get medical help right away. Call 911 and do not drive yourself to the hospital.

## 2023-01-05 NOTE — DISCHARGE NOTE PROVIDER - NSDCMRMEDTOKEN_GEN_ALL_CORE_FT
aspirin 81 mg oral delayed release tablet: 1 tab(s) orally once a day  atenolol 50 mg oral tablet: 1 tab(s) orally once a day  atorvastatin 40 mg oral tablet: 1 tab(s) orally once a day (at bedtime)  pantoprazole 40 mg oral delayed release tablet: 1 tab(s) orally once a day  valsartan 320 mg oral tablet: 1 tab(s) orally once a day

## 2023-01-05 NOTE — H&P ADULT - ATTENDING COMMENTS
HPI as above.  Interval history: Pt seen and examined at bedside. No cp or sob.   Vital Signs (24 Hrs):  T(C): 36.8 (01-05-23 @ 00:18), Max: 36.8 (01-05-23 @ 00:18)  HR: 54 (01-05-23 @ 07:19) (54 - 73)  BP: 184/88 (01-05-23 @ 07:19) (183/94 - 208/104)  RR: 19 (01-05-23 @ 07:19) (18 - 20)  SpO2: 98% (01-05-23 @ 07:19) (97% - 99%)  Wt(kg): --  Daily     Daily     I&O's Summary    PHYSICAL EXAM:  GENERAL: NAD, well-developed  HEAD:  Atraumatic, Normocephalic  EYES: EOMI, PERRLA, conjunctiva and sclera clear  NECK: Supple, No JVD  CHEST/LUNG: Clear to auscultation bilaterally; No wheeze  HEART: Regular rate and rhythm; No murmurs, rubs, or gallops  ABDOMEN: Soft, Nontender, Nondistended; Bowel sounds present  EXTREMITIES:  2+ Peripheral Pulses, No clubbing, cyanosis, or edema  PSYCH: AAOx3  NEUROLOGY: non-focal  SKIN: No rashes or lesions  Labs reviewed  Imaging reviewed independently and reviewed read  EKG reviewed independently and reviewed read    Plan    #Progress Note Handoff  Pending (specify):    Family discussion: house staff updated pt family  Disposition:   Decision to admit the pt is based on acuity as above HPI as above.  Interval history: Pt seen and examined at bedside. No cp or sob.  BP elevated at bedside  Vital Signs (24 Hrs):  T(C): 36.8 (01-05-23 @ 00:18), Max: 36.8 (01-05-23 @ 00:18)  HR: 54 (01-05-23 @ 07:19) (54 - 73)  BP: 184/88 (01-05-23 @ 07:19) (183/94 - 208/104)  RR: 19 (01-05-23 @ 07:19) (18 - 20)  SpO2: 98% (01-05-23 @ 07:19) (97% - 99%)  Wt(kg): --  Daily     Daily     I&O's Summary    PHYSICAL EXAM:  GENERAL: NAD, well-developed  HEAD:  Atraumatic, Normocephalic  EYES: EOMI, PERRLA, conjunctiva and sclera clear  NECK: Supple, No JVD  CHEST/LUNG: Clear to auscultation bilaterally; No wheeze  HEART: Regular rate and rhythm; No murmurs, rubs, or gallops  ABDOMEN: Soft, Nontender, Nondistended; Bowel sounds present  EXTREMITIES:  2+ Peripheral Pulses, No clubbing, cyanosis, or edema  PSYCH: AAOx3  NEUROLOGY: non-focal  SKIN: No rashes or lesions  Labs reviewed  Imaging reviewed independently and reviewed read  < from: CT Angio Heart and Coronaries w/ IV Cont (01.05.23 @ 08:30) >    Diminutive left circumflex artery containing a focal area of severe   stenosis proximally with nonvisualization distally, which may be due to   occlusion.    Multiple focal areas of diminutive density calcified vessel limiting   intraluminal evaluation.    The total Agatston coronary artery calcium score equals 236, which   corresponds to 75th percentile for age, gender and ethnicity.    CAD-RADS 4A/5N.    < end of copied text >      EKG reviewed independently and reviewed read  < from: 12 Lead ECG (01.05.23 @ 12:15) >    Diagnosis Line Normal sinus rhythm  Normal ECG    < end of copied text >      Plan as above, [facility] resident in real time.  bp control. follow up cath report , Plan dw cardiology team      Decision to admit the pt is based on acuity as above

## 2023-01-05 NOTE — H&P ADULT - ASSESSMENT
#CHEST PAIN  Stable vs Unstable angina   -No hemodynamic instability  -EKG - No acute ischemic changes  -CXR - neg for acute cardiopulmonary disease  -CCTA - Diminutive left circumflex artery containing a focal area of severe stenosis proximally with nonvisualization distally, which may be due to occlusion. Multiple focal areas of diminutive density calcified vessel limiting intraluminal evaluation.  -serial troponins, trop neg x 2    -f/u TTE  -give nitroglycerin if persistent chest discomfort  -start aspirin, BB, statins  -Cath today, Keep NPO   -BP control    #GERD  start GI PPX      #DVT PPX : Heparin subq  #Diet: NPO pending cath  #GI PPX: Protonix  #Activity: IAT   61 y/o M with PMH HTN, GERD presenting for shortness of breath, palpitations and sweating that started while walking up a flight of stairs prior to onset of symptoms. Notes he has had increasingly worsening chest pain and SOB with exertion over the past few weeks. No current chest pain. Previously saw Francoise years ago; started stress test which was stopped for uncontrolled htn. Denies back pain, N/V, abd pain, fever. Nonsmoker. No family hx heart disease. No recent travel or surgery. No hormonal meds. No familial hx of clotting disorders.      #CHEST PAIN  Stable angina   -No hemodynamic instability  -EKG - No acute ischemic changes  -CXR - neg for acute cardiopulmonary disease  -CCTA - Diminutive left circumflex artery containing a focal area of severe stenosis proximally with nonvisualization distally, which may be due to occlusion. Multiple focal areas of diminutive density calcified vessel limiting intraluminal evaluation.  -serial troponins, trop neg x 2    -f/u TTE  -give nitroglycerin if persistent chest discomfort  -start aspirin, BB, statins  -Cath today, Keep NPO         #Hypertensive emergency   give hydralazine 10mg IV STAT  cont valsartan 320 and atenolol 50   needs adequate BP control    #GERD  start GI PPX      #DVT PPX : Heparin subq  #Diet: NPO pending cath  #GI PPX: Protonix  #Activity: IAT   61 y/o M with PMH HTN, GERD presenting for shortness of breath, palpitations and sweating that started while walking up a flight of stairs prior to onset of symptoms. Notes he has had increasingly worsening chest pain and SOB with exertion over the past few weeks. No current chest pain. Previously saw Francoise years ago; started stress test which was stopped for uncontrolled htn. Denies back pain, N/V, abd pain, fever. Nonsmoker. No family hx heart disease. No recent travel or surgery. No hormonal meds. No familial hx of clotting disorders.      #CHEST PAIN  Stable angina   -No hemodynamic instability  -EKG - No acute ischemic changes  -CXR - neg for acute cardiopulmonary disease  -CCTA - Diminutive left circumflex artery containing a focal area of severe stenosis proximally with nonvisualization distally, which may be due to occlusion. Multiple focal areas of diminutive density calcified vessel limiting intraluminal evaluation.  -serial troponins, trop neg x 2    -f/u TTE  -give nitroglycerin if persistent chest discomfort  -start aspirin, BB, statins  -Cath today, Keep NPO         #Hypertensive emergency   give hydralazine 10mg IV STAT  cont valsartan 320 and atenolol 50   needs adequate BP control   BP improved 200s-->170s    #GERD  start GI PPX      #DVT PPX : Heparin subq  #Diet: NPO pending cath  #GI PPX: Protonix  #Activity: IAT

## 2023-01-05 NOTE — DISCHARGE NOTE PROVIDER - CARE PROVIDER_API CALL
Shashi Newberry  CARDIOVASCULAR DISEASE  [address]  [place] [address]  Phone: [phone]  Fax: [phone]  Follow Up Time: 1 week    Padma Browne  INTERNAL MEDICINE  [address] [place] [id number]  Phone: [phone]  Fax: [phone]  Follow Up Time: 2 weeks

## 2023-01-05 NOTE — ED CDU PROVIDER SUBSEQUENT DAY NOTE - ATTENDING APP SHARED VISIT CONTRIBUTION OF CARE
62-year-old male presents to the emergency department complaining of chest discomfort.  In the emergency department he had screening labs, imaging, cardiac biomarkers without acute emergent findings or changes in status and I disposition to ED observation unit for low risk chest pain protocol.  He was reevaluated, no change in status, and had plan for cardiac imaging in the a.m.  We will continue management pending testing and completion of protocol.    I personally evaluated the patient. I reviewed the Resident´s or Physician Assistant´s note (as assigned above), and agree with the findings and plan except as documented in my note.

## 2023-01-05 NOTE — H&P ADULT - NSHPLABSRESULTS_GEN_ALL_CORE
15.5   9.49  )-----------( 191      ( 04 Jan 2023 14:22 )             44.5       01-04    143  |  106  |  14  ----------------------------<  98  4.6   |  23  |  1.0    Ca    9.9      04 Jan 2023 14:22  Mg     1.7     01-04    TPro  7.7  /  Alb  4.7  /  TBili  0.7  /  DBili  x   /  AST  59<H>  /  ALT  86<H>  /  AlkPhos  94  01-04                      Lactate Trend      CARDIAC MARKERS ( 04 Jan 2023 18:54 )  x     / <0.01 ng/mL / x     / x     / x      CARDIAC MARKERS ( 04 Jan 2023 14:22 )  x     / <0.01 ng/mL / x     / x     / x            CAPILLARY BLOOD GLUCOSE

## 2023-01-05 NOTE — CHART NOTE - NSCHARTNOTEFT_GEN_A_CORE
PREOPERATIVE DAY OF PROCEDURE EVALUATION:  I have personally seen and examined the patient.  I agree with the history and physical which I have reviewed and noted any changes below.     Bleeding Risk Score:  1.1  %       (Signed electronically by __________)  01-05-23 @ 13:52

## 2023-01-05 NOTE — DISCHARGE NOTE NURSING/CASE MANAGEMENT/SOCIAL WORK - PATIENT PORTAL LINK FT
You can access the FollowMyHealth Patient Portal offered by [facility] by registering at the following website: [email or web address]. By joining [employer]’s FollowMyHealth portal, you will also be able to view your health information using other applications (apps) compatible with our system.

## 2023-01-05 NOTE — DISCHARGE NOTE PROVIDER - NSDCFUADDAPPT_GEN_ALL_CORE_FT
Please followup with you primary care doctor in 2 weeks and update on the hospitalization and the recent events. Go over with your primary care doctor over the medications you were discharged on    Please followup with Dr. Newberry in 1 week as instructed   you will have to get a TTE an ultrasound of the heart as outpatient.

## 2023-01-05 NOTE — ED CDU PROVIDER SUBSEQUENT DAY NOTE - CLINICAL SUMMARY MEDICAL DECISION MAKING FREE TEXT BOX
62-year-old male presents to the emergency department complaining of chest discomfort.  In the emergency department he had screening labs, imaging, cardiac biomarkers without acute emergent findings or changes in status and I disposition to ED observation unit for low risk chest pain protocol.  He was reevaluated, no change in status, and had plan for cardiac imaging in the a.m.  We will continue management pending testing and completion of protocol.

## 2023-01-05 NOTE — H&P ADULT - HISTORY OF PRESENT ILLNESS
61 y/o M with PMH HTN, GERD presenting for palpitations and sweating that started at approx. 10am while sitting in doctor's office with his wife (wife was the pt). A/w SOB; notes he had just walked up a flight of stairs prior to onset of symptoms. Notes he has had increasingly worsening SOB with exertion over the past few weeks. Feels better now but feels slight chest tightness. Previously saw Francoise years ago; started stress test which was stopped for uncontrolled htn. Denies back pain, N/V, abd pain, fever. Nonsmoker. No family hx heart disease. No recent travel or surgery. No hormonal meds. No familial hx of clotting disorders.    In the ED, pt hemodynamically stable, elevated /104 improved s/p metoprolol. Labs wnl Mg 1.7, Trops neg x2  61 y/o M with PMH HTN, GERD presenting for shortness of breath, palpitations and sweating that started while walking up a flight of stairs prior to onset of symptoms. Notes he has had increasingly worsening chest pain and SOB with exertion over the past few weeks. No current chest pain. Previously saw Francoise years ago; started stress test which was stopped for uncontrolled htn. Denies back pain, N/V, abd pain, fever. Nonsmoker. No family hx heart disease. No recent travel or surgery. No hormonal meds. No familial hx of clotting disorders.    In the ED, pt hemodynamically stable, elevated /104 improved s/p metoprolol. Labs wnl Mg 1.7, Trops neg x2

## 2023-01-05 NOTE — ED CDU PROVIDER DISPOSITION NOTE - CADM POA URETHRAL CATHETER
here with cough x months, treated as out pt by Dr Hawkins as pna.  recent CT chest shows no improvement and so presents to ER for PICC and outpt abx.  pt feels fairly well.  no fevers.  some mild SOB unchanged x weeks. No

## 2023-01-22 ENCOUNTER — NON-APPOINTMENT (OUTPATIENT)
Age: 63
End: 2023-01-22

## 2023-02-09 ENCOUNTER — EMERGENCY (EMERGENCY)
Facility: HOSPITAL | Age: 63
LOS: 0 days | Discharge: HOME | End: 2023-02-10
Attending: STUDENT IN AN ORGANIZED HEALTH CARE EDUCATION/TRAINING PROGRAM
Payer: MEDICARE

## 2023-02-09 VITALS
SYSTOLIC BLOOD PRESSURE: 148 MMHG | WEIGHT: 285.06 LBS | HEART RATE: 82 BPM | RESPIRATION RATE: 18 BRPM | OXYGEN SATURATION: 100 % | DIASTOLIC BLOOD PRESSURE: 91 MMHG

## 2023-02-09 DIAGNOSIS — R06.02 SHORTNESS OF BREATH: ICD-10-CM

## 2023-02-09 DIAGNOSIS — Z79.82 LONG TERM (CURRENT) USE OF ASPIRIN: ICD-10-CM

## 2023-02-09 DIAGNOSIS — Z20.822 CONTACT WITH AND (SUSPECTED) EXPOSURE TO COVID-19: ICD-10-CM

## 2023-02-09 DIAGNOSIS — I10 ESSENTIAL (PRIMARY) HYPERTENSION: ICD-10-CM

## 2023-02-09 DIAGNOSIS — R07.89 OTHER CHEST PAIN: ICD-10-CM

## 2023-02-09 DIAGNOSIS — E78.5 HYPERLIPIDEMIA, UNSPECIFIED: ICD-10-CM

## 2023-02-09 DIAGNOSIS — I25.10 ATHEROSCLEROTIC HEART DISEASE OF NATIVE CORONARY ARTERY WITHOUT ANGINA PECTORIS: ICD-10-CM

## 2023-02-09 LAB
ALBUMIN SERPL ELPH-MCNC: 4.6 G/DL — SIGNIFICANT CHANGE UP (ref 3.5–5.2)
ALP SERPL-CCNC: 102 U/L — SIGNIFICANT CHANGE UP (ref 30–115)
ALT FLD-CCNC: 62 U/L — HIGH (ref 0–41)
ANION GAP SERPL CALC-SCNC: 13 MMOL/L — SIGNIFICANT CHANGE UP (ref 7–14)
AST SERPL-CCNC: 39 U/L — SIGNIFICANT CHANGE UP (ref 0–41)
BASOPHILS # BLD AUTO: 0.05 K/UL — SIGNIFICANT CHANGE UP (ref 0–0.2)
BASOPHILS NFR BLD AUTO: 0.5 % — SIGNIFICANT CHANGE UP (ref 0–1)
BILIRUB SERPL-MCNC: 0.7 MG/DL — SIGNIFICANT CHANGE UP (ref 0.2–1.2)
BUN SERPL-MCNC: 10 MG/DL — SIGNIFICANT CHANGE UP (ref 10–20)
CALCIUM SERPL-MCNC: 9.5 MG/DL — SIGNIFICANT CHANGE UP (ref 8.4–10.5)
CHLORIDE SERPL-SCNC: 100 MMOL/L — SIGNIFICANT CHANGE UP (ref 98–110)
CO2 SERPL-SCNC: 23 MMOL/L — SIGNIFICANT CHANGE UP (ref 17–32)
CREAT SERPL-MCNC: 0.8 MG/DL — SIGNIFICANT CHANGE UP (ref 0.7–1.5)
EGFR: 99 ML/MIN/1.73M2 — SIGNIFICANT CHANGE UP
EOSINOPHIL # BLD AUTO: 0.22 K/UL — SIGNIFICANT CHANGE UP (ref 0–0.7)
EOSINOPHIL NFR BLD AUTO: 2.2 % — SIGNIFICANT CHANGE UP (ref 0–8)
FLUAV AG NPH QL: SIGNIFICANT CHANGE UP
FLUBV AG NPH QL: SIGNIFICANT CHANGE UP
GLUCOSE SERPL-MCNC: 101 MG/DL — HIGH (ref 70–99)
HCT VFR BLD CALC: 40 % — LOW (ref 42–52)
HGB BLD-MCNC: 13.9 G/DL — LOW (ref 14–18)
IMM GRANULOCYTES NFR BLD AUTO: 0.3 % — SIGNIFICANT CHANGE UP (ref 0.1–0.3)
LYMPHOCYTES # BLD AUTO: 1.41 K/UL — SIGNIFICANT CHANGE UP (ref 1.2–3.4)
LYMPHOCYTES # BLD AUTO: 13.8 % — LOW (ref 20.5–51.1)
MCHC RBC-ENTMCNC: 33.8 PG — HIGH (ref 27–31)
MCHC RBC-ENTMCNC: 34.8 G/DL — SIGNIFICANT CHANGE UP (ref 32–37)
MCV RBC AUTO: 97.3 FL — HIGH (ref 80–94)
MONOCYTES # BLD AUTO: 1.18 K/UL — HIGH (ref 0.1–0.6)
MONOCYTES NFR BLD AUTO: 11.6 % — HIGH (ref 1.7–9.3)
NEUTROPHILS # BLD AUTO: 7.31 K/UL — HIGH (ref 1.4–6.5)
NEUTROPHILS NFR BLD AUTO: 71.6 % — SIGNIFICANT CHANGE UP (ref 42.2–75.2)
NRBC # BLD: 0 /100 WBCS — SIGNIFICANT CHANGE UP (ref 0–0)
NT-PROBNP SERPL-SCNC: 48 PG/ML — SIGNIFICANT CHANGE UP (ref 0–300)
PLATELET # BLD AUTO: 167 K/UL — SIGNIFICANT CHANGE UP (ref 130–400)
POTASSIUM SERPL-MCNC: 3.8 MMOL/L — SIGNIFICANT CHANGE UP (ref 3.5–5)
POTASSIUM SERPL-SCNC: 3.8 MMOL/L — SIGNIFICANT CHANGE UP (ref 3.5–5)
PROT SERPL-MCNC: 7.3 G/DL — SIGNIFICANT CHANGE UP (ref 6–8)
RBC # BLD: 4.11 M/UL — LOW (ref 4.7–6.1)
RBC # FLD: 12.2 % — SIGNIFICANT CHANGE UP (ref 11.5–14.5)
RSV RNA NPH QL NAA+NON-PROBE: SIGNIFICANT CHANGE UP
SARS-COV-2 RNA SPEC QL NAA+PROBE: SIGNIFICANT CHANGE UP
SODIUM SERPL-SCNC: 136 MMOL/L — SIGNIFICANT CHANGE UP (ref 135–146)
TROPONIN T SERPL-MCNC: <0.01 NG/ML — SIGNIFICANT CHANGE UP
WBC # BLD: 10.2 K/UL — SIGNIFICANT CHANGE UP (ref 4.8–10.8)
WBC # FLD AUTO: 10.2 K/UL — SIGNIFICANT CHANGE UP (ref 4.8–10.8)

## 2023-02-09 PROCEDURE — 80053 COMPREHEN METABOLIC PANEL: CPT

## 2023-02-09 PROCEDURE — 71045 X-RAY EXAM CHEST 1 VIEW: CPT

## 2023-02-09 PROCEDURE — 71045 X-RAY EXAM CHEST 1 VIEW: CPT | Mod: 26

## 2023-02-09 PROCEDURE — 93010 ELECTROCARDIOGRAM REPORT: CPT | Mod: 76

## 2023-02-09 PROCEDURE — 84484 ASSAY OF TROPONIN QUANT: CPT

## 2023-02-09 PROCEDURE — 36415 COLL VENOUS BLD VENIPUNCTURE: CPT

## 2023-02-09 PROCEDURE — 99285 EMERGENCY DEPT VISIT HI MDM: CPT | Mod: 25

## 2023-02-09 PROCEDURE — 93005 ELECTROCARDIOGRAM TRACING: CPT

## 2023-02-09 PROCEDURE — 85025 COMPLETE CBC W/AUTO DIFF WBC: CPT

## 2023-02-09 PROCEDURE — 99285 EMERGENCY DEPT VISIT HI MDM: CPT | Mod: CS

## 2023-02-09 PROCEDURE — 83880 ASSAY OF NATRIURETIC PEPTIDE: CPT

## 2023-02-09 PROCEDURE — 0241U: CPT

## 2023-02-09 NOTE — ED ADULT TRIAGE NOTE - CHIEF COMPLAINT QUOTE
EMS notification for respiratory distress. PT c/o chest pain and sob. States he took an aspirin pta.

## 2023-02-10 VITALS
HEART RATE: 65 BPM | RESPIRATION RATE: 17 BRPM | OXYGEN SATURATION: 97 % | DIASTOLIC BLOOD PRESSURE: 70 MMHG | SYSTOLIC BLOOD PRESSURE: 134 MMHG

## 2023-02-10 PROBLEM — K21.9 GASTRO-ESOPHAGEAL REFLUX DISEASE WITHOUT ESOPHAGITIS: Chronic | Status: ACTIVE | Noted: 2023-01-04

## 2023-02-10 LAB — TROPONIN T SERPL-MCNC: <0.01 NG/ML — SIGNIFICANT CHANGE UP

## 2023-02-10 NOTE — ED PROVIDER NOTE - ATTENDING APP SHARED VISIT CONTRIBUTION OF CARE
63 yo m hx cad s/p cath 1/2023 (no stents, medical management), htn, hld  pt presents for eval of cp. pt was at [employer] in the evening and felt episode of sternal cp w/ associated lightheadedness and sob. no fever/chills/cough/syncope. sx were 40 minutes pta and lasted ~20 minutes.  pt w/o sx or complaints in ED    vss  gen- NAD, aaox3  card-rrr  lungs-ctab, no wheezing or rhonchi  abd-sntnd, no guarding or rebound  neuro- full str/sensation, cn ii-xii grossly intact, normal coordination

## 2023-02-10 NOTE — ED PROVIDER NOTE - CARE PROVIDER_API CALL
Shashi Newberry  CARDIOVASCULAR DISEASE  [address]  [place] [address]  Phone: [phone]  Fax: [phone]  Follow Up Time: 1-3 Days

## 2023-02-10 NOTE — ED PROVIDER NOTE - PROGRESS NOTE DETAILS
case d/w Dr. Em who is on call for pt's primary cardiologist  cath report reviewed, pt w/ mostly nonobstructive cad but distal cx w/ severe atherosclerotic changes not amenable to stent. home meds reviewed and pt on appropriate medical management.  recs are for 2 trop/ekg r/o, ED observation period. if CP free and w/u reassuring, pt can be dc for close f/u. discussed this w/ pt who agrees and understands return precautions/ f-u plan

## 2023-02-10 NOTE — ED PROVIDER NOTE - CLINICAL SUMMARY MEDICAL DECISION MAKING FREE TEXT BOX
Throughout ED observation period, pt remained clinically and hemodynamically stable.  ed w/u w/o acute findings  ekg nml, trop neg x2, pt cp free  given recent w/u, results of cath, pt clinical status and reliability, will dc w/ rec for cards f/u and return precautions

## 2023-02-10 NOTE — ED PROVIDER NOTE - PATIENT PORTAL LINK FT
You can access the FollowMyHealth Patient Portal offered by [facility] by registering at the following website: [email or web address]. By joining [employer]’s FollowMyHealth portal, you will also be able to view your health information using other applications (apps) compatible with our system.

## 2023-02-10 NOTE — ED PROVIDER NOTE - OBJECTIVE STATEMENT
62-year-old male past medical history nonobstructive CAD, hypertension, hyperlipidemia presenting to the ED for evaluation of shortness of breath and chest pain tonight while [facility] at [employer].  Patient reports chest tightness associated with difficulty breathing.  Patient reports taking aspirin prior to arrival.  Denies fever, urinary symptoms, abdominal pain, nausea, vomiting.

## 2023-02-10 NOTE — ED PROVIDER NOTE - NSFOLLOWUPINSTRUCTIONS_ED_ALL_ED_FT
Our Emergency Department Referral Coordinators will be reaching out ot you in the next 24-48 hours from 9:00am to 5:00pm (Monday to Friday) with a follow up appointment. Please expect a phone call from the hospital in that time frame. If you do not receive a call or if you have any questions or concerns, you can reach them at [phone] or [phone].      Chest Pain    Chest pain can be caused by many different conditions which may or may not be dangerous. Causes include heartburn, lung infections, heart attack, blood clot in lungs, skin infections, strain or damage to muscle, cartilage, or bones, etc. In addition to a history and physical examination, an electrocardiogram (ECG) or other lab tests may have been performed to determine the cause of your chest pain. Follow up with your primary care provider or with a cardiologist as instructed.     SEEK IMMEDIATE MEDICAL CARE IF YOU HAVE ANY OF THE FOLLOWING SYMPTOMS: worsening chest pain, coughing up blood, unexplained back/neck/jaw pain, severe abdominal pain, dizziness or lightheadedness, fainting, shortness of breath, sweaty or clammy skin, vomiting, or racing heart beat. These symptoms may represent a serious problem that is an emergency. Do not wait to see if the symptoms will go away. Get medical help right away. Call 911 and do not drive yourself to the hospital.

## 2023-02-13 ENCOUNTER — NON-APPOINTMENT (OUTPATIENT)
Age: 63
End: 2023-02-13

## 2023-03-04 ENCOUNTER — NON-APPOINTMENT (OUTPATIENT)
Age: 63
End: 2023-03-04

## 2023-05-29 ENCOUNTER — EMERGENCY (EMERGENCY)
Facility: HOSPITAL | Age: 63
LOS: 0 days | Discharge: HOME | End: 2023-05-29
Attending: EMERGENCY MEDICINE
Payer: MEDICARE

## 2023-05-29 VITALS
WEIGHT: 279.99 LBS | HEART RATE: 70 BPM | OXYGEN SATURATION: 100 % | SYSTOLIC BLOOD PRESSURE: 126 MMHG | RESPIRATION RATE: 18 BRPM | HEIGHT: 72 IN | DIASTOLIC BLOOD PRESSURE: 70 MMHG | TEMPERATURE: 99 F

## 2023-05-29 VITALS
OXYGEN SATURATION: 98 % | SYSTOLIC BLOOD PRESSURE: 148 MMHG | HEART RATE: 64 BPM | RESPIRATION RATE: 18 BRPM | DIASTOLIC BLOOD PRESSURE: 80 MMHG | TEMPERATURE: 99 F

## 2023-05-29 DIAGNOSIS — R06.02 SHORTNESS OF BREATH: ICD-10-CM

## 2023-05-29 DIAGNOSIS — R07.89 OTHER CHEST PAIN: ICD-10-CM

## 2023-05-29 DIAGNOSIS — I10 ESSENTIAL (PRIMARY) HYPERTENSION: ICD-10-CM

## 2023-05-29 DIAGNOSIS — Z79.82 LONG TERM (CURRENT) USE OF ASPIRIN: ICD-10-CM

## 2023-05-29 DIAGNOSIS — E78.5 HYPERLIPIDEMIA, UNSPECIFIED: ICD-10-CM

## 2023-05-29 DIAGNOSIS — R60.0 LOCALIZED EDEMA: ICD-10-CM

## 2023-05-29 DIAGNOSIS — K21.9 GASTRO-ESOPHAGEAL REFLUX DISEASE WITHOUT ESOPHAGITIS: ICD-10-CM

## 2023-05-29 LAB
ALBUMIN SERPL ELPH-MCNC: 4.7 G/DL — SIGNIFICANT CHANGE UP (ref 3.5–5.2)
ALP SERPL-CCNC: 105 U/L — SIGNIFICANT CHANGE UP (ref 30–115)
ALT FLD-CCNC: 50 U/L — HIGH (ref 0–41)
ANION GAP SERPL CALC-SCNC: 10 MMOL/L — SIGNIFICANT CHANGE UP (ref 7–14)
AST SERPL-CCNC: 39 U/L — SIGNIFICANT CHANGE UP (ref 0–41)
BASE EXCESS BLDV CALC-SCNC: 1.9 MMOL/L — SIGNIFICANT CHANGE UP (ref -2–3)
BASOPHILS # BLD AUTO: 0.06 K/UL — SIGNIFICANT CHANGE UP (ref 0–0.2)
BASOPHILS NFR BLD AUTO: 0.7 % — SIGNIFICANT CHANGE UP (ref 0–1)
BILIRUB SERPL-MCNC: 0.5 MG/DL — SIGNIFICANT CHANGE UP (ref 0.2–1.2)
BUN SERPL-MCNC: 13 MG/DL — SIGNIFICANT CHANGE UP (ref 10–20)
CA-I SERPL-SCNC: 1.23 MMOL/L — SIGNIFICANT CHANGE UP (ref 1.15–1.33)
CALCIUM SERPL-MCNC: 9.5 MG/DL — SIGNIFICANT CHANGE UP (ref 8.4–10.5)
CHLORIDE SERPL-SCNC: 104 MMOL/L — SIGNIFICANT CHANGE UP (ref 98–110)
CO2 SERPL-SCNC: 25 MMOL/L — SIGNIFICANT CHANGE UP (ref 17–32)
CREAT SERPL-MCNC: 0.9 MG/DL — SIGNIFICANT CHANGE UP (ref 0.7–1.5)
EGFR: 96 ML/MIN/1.73M2 — SIGNIFICANT CHANGE UP
EOSINOPHIL # BLD AUTO: 0.18 K/UL — SIGNIFICANT CHANGE UP (ref 0–0.7)
EOSINOPHIL NFR BLD AUTO: 2.2 % — SIGNIFICANT CHANGE UP (ref 0–8)
GAS PNL BLDV: 137 MMOL/L — SIGNIFICANT CHANGE UP (ref 136–145)
GAS PNL BLDV: SIGNIFICANT CHANGE UP
GLUCOSE SERPL-MCNC: 118 MG/DL — HIGH (ref 70–99)
HCO3 BLDV-SCNC: 28 MMOL/L — SIGNIFICANT CHANGE UP (ref 22–29)
HCT VFR BLD CALC: 40.1 % — LOW (ref 42–52)
HCT VFR BLDA CALC: 43 % — SIGNIFICANT CHANGE UP (ref 39–51)
HGB BLD CALC-MCNC: 14.4 G/DL — SIGNIFICANT CHANGE UP (ref 12.6–17.4)
HGB BLD-MCNC: 13.9 G/DL — LOW (ref 14–18)
IMM GRANULOCYTES NFR BLD AUTO: 1.2 % — HIGH (ref 0.1–0.3)
LACTATE BLDV-MCNC: 1.2 MMOL/L — SIGNIFICANT CHANGE UP (ref 0.5–2)
LYMPHOCYTES # BLD AUTO: 0.73 K/UL — LOW (ref 1.2–3.4)
LYMPHOCYTES # BLD AUTO: 9 % — LOW (ref 20.5–51.1)
MAGNESIUM SERPL-MCNC: 2 MG/DL — SIGNIFICANT CHANGE UP (ref 1.8–2.4)
MCHC RBC-ENTMCNC: 33.9 PG — HIGH (ref 27–31)
MCHC RBC-ENTMCNC: 34.7 G/DL — SIGNIFICANT CHANGE UP (ref 32–37)
MCV RBC AUTO: 97.8 FL — HIGH (ref 80–94)
MONOCYTES # BLD AUTO: 1.02 K/UL — HIGH (ref 0.1–0.6)
MONOCYTES NFR BLD AUTO: 12.6 % — HIGH (ref 1.7–9.3)
NEUTROPHILS # BLD AUTO: 6.02 K/UL — SIGNIFICANT CHANGE UP (ref 1.4–6.5)
NEUTROPHILS NFR BLD AUTO: 74.3 % — SIGNIFICANT CHANGE UP (ref 42.2–75.2)
NRBC # BLD: 0 /100 WBCS — SIGNIFICANT CHANGE UP (ref 0–0)
NT-PROBNP SERPL-SCNC: 86 PG/ML — SIGNIFICANT CHANGE UP (ref 0–300)
PCO2 BLDV: 47 MMHG — SIGNIFICANT CHANGE UP (ref 42–55)
PH BLDV: 7.38 — SIGNIFICANT CHANGE UP (ref 7.32–7.43)
PLATELET # BLD AUTO: 163 K/UL — SIGNIFICANT CHANGE UP (ref 130–400)
PMV BLD: 9.9 FL — SIGNIFICANT CHANGE UP (ref 7.4–10.4)
PO2 BLDV: 27 MMHG — SIGNIFICANT CHANGE UP
POTASSIUM BLDV-SCNC: 4.3 MMOL/L — SIGNIFICANT CHANGE UP (ref 3.5–5.1)
POTASSIUM SERPL-MCNC: 4.4 MMOL/L — SIGNIFICANT CHANGE UP (ref 3.5–5)
POTASSIUM SERPL-SCNC: 4.4 MMOL/L — SIGNIFICANT CHANGE UP (ref 3.5–5)
PROT SERPL-MCNC: 7.3 G/DL — SIGNIFICANT CHANGE UP (ref 6–8)
RBC # BLD: 4.1 M/UL — LOW (ref 4.7–6.1)
RBC # FLD: 12.5 % — SIGNIFICANT CHANGE UP (ref 11.5–14.5)
SAO2 % BLDV: 47.7 % — SIGNIFICANT CHANGE UP
SODIUM SERPL-SCNC: 139 MMOL/L — SIGNIFICANT CHANGE UP (ref 135–146)
TROPONIN T SERPL-MCNC: <0.01 NG/ML — SIGNIFICANT CHANGE UP
WBC # BLD: 8.11 K/UL — SIGNIFICANT CHANGE UP (ref 4.8–10.8)
WBC # FLD AUTO: 8.11 K/UL — SIGNIFICANT CHANGE UP (ref 4.8–10.8)

## 2023-05-29 PROCEDURE — 84132 ASSAY OF SERUM POTASSIUM: CPT

## 2023-05-29 PROCEDURE — 82803 BLOOD GASES ANY COMBINATION: CPT

## 2023-05-29 PROCEDURE — 82330 ASSAY OF CALCIUM: CPT

## 2023-05-29 PROCEDURE — 85014 HEMATOCRIT: CPT

## 2023-05-29 PROCEDURE — 85025 COMPLETE CBC W/AUTO DIFF WBC: CPT

## 2023-05-29 PROCEDURE — 99285 EMERGENCY DEPT VISIT HI MDM: CPT | Mod: GC

## 2023-05-29 PROCEDURE — 71045 X-RAY EXAM CHEST 1 VIEW: CPT | Mod: 26

## 2023-05-29 PROCEDURE — 83880 ASSAY OF NATRIURETIC PEPTIDE: CPT

## 2023-05-29 PROCEDURE — 84484 ASSAY OF TROPONIN QUANT: CPT

## 2023-05-29 PROCEDURE — 71045 X-RAY EXAM CHEST 1 VIEW: CPT

## 2023-05-29 PROCEDURE — 84295 ASSAY OF SERUM SODIUM: CPT

## 2023-05-29 PROCEDURE — 93010 ELECTROCARDIOGRAM REPORT: CPT

## 2023-05-29 PROCEDURE — 99285 EMERGENCY DEPT VISIT HI MDM: CPT | Mod: 25

## 2023-05-29 PROCEDURE — 80053 COMPREHEN METABOLIC PANEL: CPT

## 2023-05-29 PROCEDURE — 85018 HEMOGLOBIN: CPT

## 2023-05-29 PROCEDURE — 93005 ELECTROCARDIOGRAM TRACING: CPT

## 2023-05-29 PROCEDURE — 83605 ASSAY OF LACTIC ACID: CPT

## 2023-05-29 PROCEDURE — 83735 ASSAY OF MAGNESIUM: CPT

## 2023-05-29 NOTE — ED ADULT NURSE NOTE - NSFALLUNIVINTERV_ED_ALL_ED
Bed/Stretcher in lowest position, wheels locked, appropriate side rails in place/Call bell, personal items and telephone in reach/Instruct patient to call for assistance before getting out of bed/chair/stretcher/Non-slip footwear applied when patient is off stretcher/[place] to call system/Physically safe environment - no spills, clutter or unnecessary equipment/Purposeful proactive rounding/Room/bathroom lighting operational, light cord in reach

## 2023-05-29 NOTE — ED PROVIDER NOTE - PATIENT PORTAL LINK FT
You can access the FollowMyHealth Patient Portal offered by [facility] by registering at the following website: [email or web address]. By joining [employer]’s FollowMyHealth portal, you will also be able to view your health information using other applications (apps) compatible with our system.

## 2023-05-29 NOTE — ED PROVIDER NOTE - NS ED ATTENDING STATEMENT MOD
This was a shared visit with the CÉSAR. I reviewed and verified the documentation and independently performed the documented: Attending with

## 2023-05-29 NOTE — ED ADULT TRIAGE NOTE - CHIEF COMPLAINT QUOTE
Patient complaining of shortness of breath that started 30 minutes PTA. Patient recently started taking Indomethacin for gout and believes it is related to that. No rash

## 2023-05-29 NOTE — ED PROVIDER NOTE - DISCHARGE DATE
Per RN request to see couplet. MOB is asking for formula. When this LC came in, MOB states she had been trying for the past hour to latch infant. MOB also stated she has had a history of low milk supply, and plans to do both breast and bottle.     MOB is aware of how a good latch should feel like, and denies pain with breastfeeding. Encouraged to attempt, as this LC observed infant sucking on pacifier well. MOB seemed uninterested to try to latch. Discussed with MOB to help with increasing milk supply to always offer breast first, then can supplement.     MOB has [employer], and is aware of outpatient lactation benefits available to her via the [employer].     Discussed with MANUEL Matos to start MOB pumping, to help with increasing milk supply.     Encouraged to call for support as needed.        29-May-2023

## 2023-05-29 NOTE — ED PROVIDER NOTE - CARE PROVIDER_API CALL
Taran Huynh  Interventional Cardiology  [address] [place] [address], [address]  [place] [address]  Phone: [phone]  Fax: [phone]  Follow Up Time: 1-3 Days

## 2023-05-29 NOTE — ED PROVIDER NOTE - NSFOLLOWUPINSTRUCTIONS_ED_ALL_ED_FT
Chest Pain    Chest pain can be caused by many different conditions which may or may not be dangerous. Causes include heartburn, lung infections, heart attack, blood clot in lungs, skin infections, strain or damage to muscle, cartilage, or bones, etc. In addition to a history and physical examination, an electrocardiogram (ECG) or other lab tests may have been performed to determine the cause of your chest pain. Follow up with your primary care provider or with a cardiologist as instructed.     SEEK IMMEDIATE MEDICAL CARE IF YOU HAVE ANY OF THE FOLLOWING SYMPTOMS: worsening chest pain, coughing up blood, unexplained back/neck/jaw pain, severe abdominal pain, dizziness or lightheadedness, fainting, shortness of breath, sweaty or clammy skin, vomiting, or racing heart beat. These symptoms may represent a serious problem that is an emergency. Do not wait to see if the symptoms will go away. Get medical help right away. Call 911 and do not drive yourself to the hospital.      Shortness of breath    Shortness of breath (dyspnea) means you have trouble breathing and could indicate a medical problem. Causes include lung disease, heart disease, low amount of red blood cells (anemia), poor physical fitness, being overweight, smoking, etc. Your health care provider today may not be able to find a cause for your shortness of breath after your exam. In this case, it is important to have a follow-up exam with your primary care physician as instructed. If medicines were prescribed, take them as directed for the full length of time directed. Refrain from tobacco products.    SEEK IMMEDIATE MEDICAL CARE IF YOU HAVE ANY OF THE FOLLOWING SYMPTOMS: worsening shortness of breath, chest pain, back pain, abdominal pain, fever, coughing up blood, lightheadedness/dizziness.

## 2023-05-29 NOTE — ED PROVIDER NOTE - CLINICAL SUMMARY MEDICAL DECISION MAKING FREE TEXT BOX
Patient here with shortness of breath which has subsequently resolved.  Here x-ray within normal limits screening lab including troponin and BNP within normal limits as well.  Lastly, EKG normal sinus rhythm.  Patient asymptomatic stable for discharge.  While it is possible that the patient had a PE, however the patient is not tachycardic nor hypoxic with no chest pain or symptoms now.

## 2023-05-29 NOTE — ED PROVIDER NOTE - OBJECTIVE STATEMENT
62-year-old male with PMHx of GERD hypertension HLD presents for evaluation of shortness of breath that began about 30 minutes PTA. Pt reports his sx may be related to him taking indomethacin for gout. Pt also reports having chest tightness. Pt has no other complaints. Denies f/c palpitations throat pain drooling n/v/d abd pain diaphoresis calf pain or rash.

## 2023-05-29 NOTE — ED PROVIDER NOTE - ATTENDING APP SHARED VISIT CONTRIBUTION OF CARE
62-year-old male with history of GERD hypertension here valuation of shortness of breath.  Patient reports prior arrival patient shortness of breath last about 30 minutes.  He patient feels he might related to him taking indomethacin for gout.  Of note he had a coronary CT that was within normal limits recently.  Patient is no PE risk factors as no leg swelling hemoptysis.  Here on exam patient distress S1-S2 clear to auscultation other soft nontender no calf swelling neurologic gross intact 1+ pedal edema bilaterally  Impression  Patient here with shortness of breath which has subsequently resolved.  Here x-ray within normal limits screening lab including troponin and BNP within normal limits as well.  Lastly, EKG normal sinus rhythm.  Patient asymptomatic stable for discharge.  While it is possible that the patient had a PE, however the patient is not tachycardic nor hypoxic with no chest pain or symptoms now.

## 2023-05-29 NOTE — ED PROVIDER NOTE - PHYSICAL EXAMINATION
VITAL SIGNS: I have reviewed nursing notes and confirm.  CONSTITUTIONAL: non-toxic, well appearing  SKIN: no rash, no petechiae, no urticaria   EYES: PERRL, pink conjunctiva, anicteric  ENT: tongue midline, no exudates, MMM  NECK: Supple; no meningismus, no JVD  CARD: RRR, no murmurs, equal radial pulses bilaterally 2+  RESP: CTAB, no respiratory distress, no wheezing, no tachypnea, no crackles   ABD: Soft, non-tender, non-distended  EXT: Normal ROM x4. No calves tenderness, + 1+ pedal edema bilaterally  NEURO: Alert, oriented, equal strength bilaterally, nl gait.  PSYCH: Cooperative, appropriate.

## 2023-05-29 NOTE — ED PROVIDER NOTE - ATTENDING CONTRIBUTION TO CARE
Chief Complaint   Patient presents with     Prostate Biopsy       Blood pressure 131/84, pulse 90, height 1.829 m (6'), weight 86.2 kg (190 lb). Body mass index is 25.77 kg/m .    Patient Active Problem List   Diagnosis     CARDIOVASCULAR SCREENING; LDL GOAL LESS THAN 160     Oropharyngeal cancer (H)     Encounter for antineoplastic chemotherapy     Malignant neoplasm of base of tongue (H)     Subjective tinnitus     Mass of left side of neck     Advanced directives, counseling/discussion     Benign prostate hyperplasia       Allergies   Allergen Reactions     Vicodin [Hydrocodone-Acetaminophen] Other (See Comments)     Patient gets headache with Vicodin       Current Outpatient Medications   Medication Sig Dispense Refill     sildenafil (REVATIO) 20 MG tablet Take 1-5 tablets as needed for sexual activity 90 tablet 11       Social History     Tobacco Use     Smoking status: Never Smoker     Smokeless tobacco: Never Used   Substance Use Topics     Alcohol use: No     Comment: none     Drug use: No       Invasive Procedure Safety Checklist:    Procedure: MRI fusion TRUS prostate biopsy    Action: Complete sections and checkboxes as appropriate.    Pre-procedure:  1. Patient ID Verified with 2 identifiers (Josselin and  or MRN) : YES    2. Procedure and site verified with patient/designee (when able) : YES    3. Accurate consent documentation in medical record : YES    4. H&P (or appropriate assessment) documented in medical record : N/A  H&P must be up to 30 days prior to procedure an updated within 24 hours of                 Procedure as applicable.     5. Relevant diagnostic and radiology test results appropriately labeled and displayed as applicable : YES    6. Blood products, implants, devices, and/or special equipment available for the procedure as applicable : YES    7. Procedure site(s) marked with provider initials [Exclusions: none] : NO    8. Marking not required. Reason : Yes  Procedure does not require  site marking    Time Out:     Time-Out performed immediately prior to starting procedure, including verbal and active participation of all team members addressing: YES    1. Correct patient identity.  2. Confirmed that the correct side and site are marked.  3. An accurate procedure to be done.  4. Agreement on the procedure to be done.  5. Correct patient position.  6. Relevant images and results are properly labeled and appropriately displayed.  7. The need to administer antibiotics or fluids for irrigation purposes during the procedure as applicable.  8. Safety precautions based on patient history or medication use.    During Procedure: Verification of correct person, site, and procedure occurs any time the responsibility for care of the patient is transferred to another member of the care team.      The following medication was given:     MEDICATION:  Lidocaine without epinephrine 1%  ROUTE: administered by physician - prostate nerve block   SITE: administered by physician - prostate nerve block    DOSE: 10 mL  LOT #: [id number]  : [employer]  EXPIRATION DATE: 12/25  NDC#: 63258-131-58   Was there drug waste? Yes  Amount of drug waste (mL): 20 mL.  Reason for waste:  Single use vial    Prior to injection, verified patient identity using patient's name and date of birth.  Due to injection administration, patient instructed to remain in clinic for 15 minutes  afterwards, and to report any adverse reaction to me immediately.    Drug Amount Wasted:  Yes: 20 mL (10 mg/ml )  Vial/Syringe: Single dose vial    Edith Saucedo  8/15/2022  8:25 AM   62-year-old male with history of GERD hypertension here valuation of shortness of breath.  Patient reports prior arrival patient shortness of breath last about 30 minutes.  He patient feels he might related to him taking indomethacin for gout.  Of note he had a coronary CT that was within normal limits recently.  Patient is no PE risk factors as no leg swelling hemoptysis.  Here on exam patient distress S1-S2 clear to auscultation other soft nontender no calf swelling neurologic gross intact 1+ pedal edema bilaterally  Impression  Patient here with shortness of breath which has subsequently resolved.  Here x-ray within normal limits screening lab including troponin and BNP within normal limits as well.  Lastly, EKG normal sinus rhythm.  Patient asymptomatic stable for discharge.  While it is possible that the patient had a PE, however the patient is not tachycardic nor hypoxic with no chest pain or symptoms now.

## 2023-05-29 NOTE — ED PROVIDER NOTE - PROGRESS NOTE DETAILS
Authored by Dr. Kendall: pt feeling better, pt and wife are comfortable w DC and will f/u as outpt w cardio

## 2023-07-23 NOTE — ED ADULT NURSE NOTE - NSIMPLEMENTINTERV_GEN_ALL_ED
Implemented All Universal Safety Interventions:  [place] to call system. Call bell, personal items and telephone within reach. Instruct patient to call for assistance. Room bathroom lighting operational. Non-slip footwear when patient is off stretcher. Physically safe environment: no spills, clutter or unnecessary equipment. Stretcher in lowest position, wheels locked, appropriate side rails in place.
5

## 2023-11-18 ENCOUNTER — NON-APPOINTMENT (OUTPATIENT)
Age: 63
End: 2023-11-18

## 2023-12-12 ENCOUNTER — NON-APPOINTMENT (OUTPATIENT)
Age: 63
End: 2023-12-12

## 2024-02-20 ENCOUNTER — APPOINTMENT (OUTPATIENT)
Dept: NEUROLOGY | Facility: CLINIC | Age: 64
End: 2024-02-20
Payer: MEDICARE

## 2024-02-20 VITALS
HEIGHT: 72 IN | HEART RATE: 83 BPM | WEIGHT: 270 LBS | DIASTOLIC BLOOD PRESSURE: 78 MMHG | BODY MASS INDEX: 36.57 KG/M2 | TEMPERATURE: 98.1 F | SYSTOLIC BLOOD PRESSURE: 133 MMHG

## 2024-02-20 PROCEDURE — 99204 OFFICE O/P NEW MOD 45 MIN: CPT

## 2024-02-20 NOTE — PHYSICAL EXAM

## 2024-02-20 NOTE — ASSESSMENT
[FreeTextEntry1] : Tremor - Essential tremor versus alcohol related tremor -Can consider switching atenolol to propranolol if appropriate  -Because of his alcohol use, primidone is not advised. - MRI of the brain without gadolinium - Cogwheel rigidity bilaterally was seen on exam, we may consider sending him for STEPHEN scan to rule out Parkinson's disease  Total clinician time spent  is  46 minutes including preparing to see the patient, obtaining and/or reviewing and confirming history, performing a medically necessary and appropriate examination, counseling and educating the patient and/or family, documenting clinical information in the HER and communicating and/or referring to other healthcare professionals.   I, Carlota Cochran, Attest that this documentation has been prepared under the direction and in the presence of Provider Law House DO  Thank You for letting me assist in the management of this patient.   Law House DO Board Certified, Neurology

## 2024-03-05 ENCOUNTER — NON-APPOINTMENT (OUTPATIENT)
Age: 64
End: 2024-03-05

## 2024-03-13 ENCOUNTER — APPOINTMENT (OUTPATIENT)
Dept: GASTROENTEROLOGY | Facility: CLINIC | Age: 64
End: 2024-03-13
Payer: MEDICARE

## 2024-03-13 VITALS
WEIGHT: 269 LBS | DIASTOLIC BLOOD PRESSURE: 90 MMHG | SYSTOLIC BLOOD PRESSURE: 156 MMHG | HEART RATE: 60 BPM | BODY MASS INDEX: 36.44 KG/M2 | HEIGHT: 72 IN | OXYGEN SATURATION: 99 %

## 2024-03-13 DIAGNOSIS — R94.5 ABNORMAL RESULTS OF LIVER FUNCTION STUDIES: ICD-10-CM

## 2024-03-13 DIAGNOSIS — E78.1 PURE HYPERGLYCERIDEMIA: ICD-10-CM

## 2024-03-13 DIAGNOSIS — Z86.79 PERSONAL HISTORY OF OTHER DISEASES OF THE CIRCULATORY SYSTEM: ICD-10-CM

## 2024-03-13 DIAGNOSIS — Z87.39 PERSONAL HISTORY OF OTHER DISEASES OF THE MUSCULOSKELETAL SYSTEM AND CONNECTIVE TISSUE: ICD-10-CM

## 2024-03-13 DIAGNOSIS — Z87.19 PERSONAL HISTORY OF OTHER DISEASES OF THE DIGESTIVE SYSTEM: ICD-10-CM

## 2024-03-13 DIAGNOSIS — Z78.9 OTHER SPECIFIED HEALTH STATUS: ICD-10-CM

## 2024-03-13 DIAGNOSIS — M47.9 SPONDYLOSIS, UNSPECIFIED: ICD-10-CM

## 2024-03-13 PROCEDURE — 91200 LIVER ELASTOGRAPHY: CPT

## 2024-03-13 PROCEDURE — 99205 OFFICE O/P NEW HI 60 MIN: CPT | Mod: 25

## 2024-03-13 RX ORDER — ASPIRIN 81 MG/1
81 TABLET, COATED ORAL DAILY
Qty: 1 | Refills: 0 | Status: ACTIVE | COMMUNITY
Start: 2024-03-13

## 2024-03-14 PROBLEM — Z86.79 HISTORY OF HYPERTENSION: Status: RESOLVED | Noted: 2024-03-13 | Resolved: 2024-03-14

## 2024-03-14 PROBLEM — Z87.19 HISTORY OF HEMORRHOIDS: Status: RESOLVED | Noted: 2024-03-13 | Resolved: 2024-03-14

## 2024-03-14 PROBLEM — M47.9 OSTEOARTHRITIS OF SPINE, UNSPECIFIED SPINAL OSTEOARTHRITIS COMPLICATION STATUS, UNSPECIFIED SPINAL REGION: Status: RESOLVED | Noted: 2024-03-14 | Resolved: 2024-03-14

## 2024-03-14 PROBLEM — Z78.9 SOCIAL ALCOHOL USE: Status: ACTIVE | Noted: 2024-03-13

## 2024-03-14 PROBLEM — Z87.39 HISTORY OF GOUT: Status: RESOLVED | Noted: 2024-03-14 | Resolved: 2024-03-14

## 2024-03-14 RX ORDER — FAMOTIDINE 10 MG/1
TABLET, FILM COATED ORAL
Refills: 0 | Status: ACTIVE | COMMUNITY

## 2024-03-14 RX ORDER — PANTOPRAZOLE 20 MG/1
20 TABLET, DELAYED RELEASE ORAL
Refills: 0 | Status: ACTIVE | COMMUNITY

## 2024-03-14 RX ORDER — NIFEDIPINE 90 MG
TABLET, EXTENDED RELEASE ORAL
Refills: 0 | Status: ACTIVE | COMMUNITY

## 2024-03-14 RX ORDER — ATORVASTATIN CALCIUM 80 MG/1
TABLET, FILM COATED ORAL
Refills: 0 | Status: ACTIVE | COMMUNITY

## 2024-03-14 RX ORDER — ATENOLOL 50 MG/1
TABLET ORAL
Refills: 0 | Status: ACTIVE | COMMUNITY

## 2024-03-14 RX ORDER — VALSARTAN AND HYDROCHLOROTHIAZIDE 160; 25 MG/1; MG/1
160-25 TABLET, FILM COATED ORAL
Refills: 0 | Status: ACTIVE | COMMUNITY

## 2024-03-14 NOTE — REVIEW OF SYSTEMS
[Recent Weight Loss (___ Lbs)] : recent [unfilled] ~Ulb weight loss [As Noted in HPI] : as noted in HPI [Arthralgias] : arthralgias [Fever] : no fever [Chills] : no chills [Eye Pain] : no eye pain [Earache] : no earache [Red Eyes] : eyes not red [Loss Of Hearing] : no hearing loss [Chest Pain] : no chest pain [Palpitations] : no palpitations [Cough] : no cough [Skin Lesions] : no skin lesions [SOB on Exertion] : no shortness of breath during exertion [Confused] : no confusion [Convulsions] : no convulsions [Easy Bleeding] : no tendency for easy bleeding [Easy Bruising] : no tendency for easy bruising

## 2024-03-14 NOTE — PHYSICAL EXAM
[General Appearance - Well Nourished] : well nourished [General Appearance - Well Developed] : well developed [General Appearance - Alert] : alert [Hearing Threshold Finger Rub Not [place]] : hearing was normal [Sclera] : the sclera and conjunctiva were normal [Neck Appearance] : the appearance of the neck was normal [Neck Cervical Mass (___cm)] : no neck mass was observed [Jugular Venous Distention Increased] : there was no jugular-venous distention [Respiration, Rhythm And Depth] : normal respiratory rhythm and effort [Thyroid Diffuse Enlargement] : the thyroid was not enlarged [Auscultation Breath Sounds / Voice Sounds] : lungs were clear to auscultation bilaterally [Heart Sounds] : normal S1 and S2 [Murmurs] : no murmurs [Edema] : there was no peripheral edema [Abdomen Soft] : soft [Abdomen Tenderness] : non-tender [Cervical Lymph Nodes Enlarged Posterior Bilaterally] : posterior cervical [Cervical Lymph Nodes Enlarged Anterior Bilaterally] : anterior cervical [Supraclavicular Lymph Nodes Enlarged Bilaterally] : supraclavicular [Nail Clubbing] : no clubbing  or cyanosis of the fingernails [No Focal Deficits] : no focal deficits [Oriented To Time, Place, And Person] : oriented to person, place, and time [FreeTextEntry1] : Tremors +

## 2024-03-14 NOTE — CONSULT LETTER
[Dear  ___] : Dear  [unfilled], [Consult Letter:] : I had the pleasure of evaluating your patient, [unfilled]. [Please see my note below.] : Please see my note below. [Consult Closing:] : Thank you very much for allowing me to participate in the care of this patient.  If you have any questions, please do not hesitate to contact me. [Sincerely,] : Sincerely, [FreeTextEntry3] : Sha Barriga [FreeTextEntry2] : Dr Padma Browne MD

## 2024-03-14 NOTE — ASSESSMENT
[FreeTextEntry1] : 63 y o  M with obesity HTN elevated TG CAD gout tremors seen for steatosis liver disease and liver lesion  Liver lesion - ? hemangioma  2 cm  L lobe liver lesion on US Jan 2024. US abdomen 1.6 cm R hepatic lobe lesion.  No contrast study on system radiology reporting previously determined as hemangioma. Unclear if 2 different lesions  Will get MRI liver.   Elevated transaminases  - likely  Met ALD Chronic RUQ pain. New tremors Obesity BMI 38 HTN elevated TG gout C Had elevation of liver tests on system since 2019 With aST 100  improved since With AST ALT in 40 - 80 range. Normal platelet US showing steatosis and hepatomegaly   Fibroscan -  CAP  346 S3 steatosis   LS  5.8 kPA F0 fibrosis DUBOIS fibrosure A1AT ceruloplasmin HCV  autoimmune work up MASLD likley but wtih tremors rule out [place]  Treatment Nutrition - low carb high protein / Mediterranean diet Limit red meat Exercise 5 days a week with aerobic activity 30 - 45 minutes 3 day and  non aerobic activity like weights, resistance 2 times a week. Advise alcohol  abstinence Consider Vit E and semaglutide on follow up.  Health maintenance HBV HCV screening Check immune status for vaccination for hepatitis A and B Colonoscopy -  done at [facility] in last 8 months was normal.

## 2024-03-14 NOTE — HISTORY OF PRESENT ILLNESS
[___] : Performed [unfilled] [de-identified] : Had US done in 2019 for RUQ pain.  Reports having RUQ discomfort daily constant non radiating with no increasing or reducing factors. Reports  no relation to food position or BM.  No other symptoms.  US in 2019 showed 1.6 cm R lobe indeterminate mass while US in 2024 Jan reporting 2 cm L lobe lesion.  Had fatty liver for years.  Has had elevated liver enzymes on records since 2019.  .  Since 2023 AST ALT 40 - 80 range. No jaundice confusion hematemesis or melena.  Had GERD in the past and symptoms are controlled with PPI.  No dysphagia odynophagia No diarrhea constipation. On statin since 2013 for CAD.  Max wt 295 lb. Was overweight for more than 5 years. Lost 25 lb in past year with diet changes. Hx of HTN elevated TG Gout DJD spine Has been having tremors and seen neurology. Was in ER with chest pain  Retired Former  and then  and now on disability Lives with wife  Has 2 children Alcohol 4 glasses wine per day Non smoker No drugs  No family hx of liver disease or liver cancer  [de-identified] : WBC 8 Hb 13.9  PLt 163 AST 39 ALT 50  T michel 0.5  [de-identified] : Indeterminate right hepatic lobe mass measuring 1.6 x 1.2 x 1.3 cm. The background liver parenchyma is echogenic, most consistent with  hepatic steatosis.  No gallstones or definite acute findings.   [FreeTextEntry1] : US abdomen Jan 2025 Liver 17.9 cm Hypoechoic lesion 2 cm lesion L lobe  Oct 2019   ALP 85  Jan 2023 AST 59 ALT 86 ALP 94 Feb 2023 AST 39 ALT 62 Nov 2023 HBa1c  5.2  TSH 4.4  LDL 89 AST 43 ALT 40  PLt 230    2023 CT coronary angio Diminutive left circumflex artery containing a focal area of severe  stenosis proximally with nonvisualization distally, which may be due to  occlusion. Multiple focal areas of diminutive density calcified vessel limiting  intraluminal evaluation. The total Agatston coronary artery calcium score equals 236, which corresponds to 75th percentile for age, gender and ethnicity. CAD-RADS 4A/5N.  Cardiac cath Coronary Dominance: right LM: minor irregularities LAD: mild disease. 40% ostial D1 CX: severe distal disease, small vessel. 40% OM1 stenosis RCA: mild disease in RPDA. Minor irregularities in remainder

## 2024-03-26 ENCOUNTER — APPOINTMENT (OUTPATIENT)
Dept: NEUROLOGY | Facility: CLINIC | Age: 64
End: 2024-03-26
Payer: MEDICARE

## 2024-03-26 VITALS
DIASTOLIC BLOOD PRESSURE: 83 MMHG | HEIGHT: 72 IN | BODY MASS INDEX: 36.57 KG/M2 | WEIGHT: 270 LBS | HEART RATE: 67 BPM | SYSTOLIC BLOOD PRESSURE: 141 MMHG

## 2024-03-26 PROCEDURE — 99214 OFFICE O/P EST MOD 30 MIN: CPT

## 2024-03-26 NOTE — ASSESSMENT
[FreeTextEntry1] : 63 year old male with bilateral hand tremor progressive x 2 years. Today we reviewed his MRI of the Brain noted above. I have ordered a Cornelia Scan of the Brain to evaluate for potential Parkinsons Disease. If negative we can consider discussing Propranolol in place of Atenolol if approved by his cardiologist. Patient will return to the office once testing has been completed for review.  Supervising Physician : Law House, DO

## 2024-03-26 NOTE — PHYSICAL EXAM
[Person] : oriented to person [Place] : oriented to place [Time] : oriented to time [Visual Intact] : visual attention was ~T not ~L decreased [Concentration Intact] : normal concentrating ability [Repeating Phrases] : no difficulty repeating a phrase [Naming Objects] : no difficulty naming common objects [Fluency] : fluency intact [Writing A Sentence] : no difficulty writing a sentence [Comprehension] : comprehension intact [Reading] : reading intact [Cranial Nerves Optic (II)] : visual acuity intact bilaterally,  visual fields full to confrontation, pupils equal round and reactive to light [Past History] : adequate knowledge of personal past history [Cranial Nerves Oculomotor (III)] : extraocular motion intact [Cranial Nerves Trigeminal (V)] : facial sensation intact symmetrically [Cranial Nerves Facial (VII)] : face symmetrical [Cranial Nerves Glossopharyngeal (IX)] : tongue and palate midline [Cranial Nerves Vestibulocochlear (VIII)] : hearing was intact bilaterally [Cranial Nerves Accessory (XI - Cranial And Spinal)] : head turning and shoulder shrug symmetric [Cranial Nerves Hypoglossal (XII)] : there was no tongue deviation with protrusion [Motor Strength] : muscle strength was normal in all four extremities [Motor Tone] : muscle tone was normal in all four extremities [No Muscle Atrophy] : normal bulk in all four extremities [Sensation Tactile Decrease] : light touch was intact [Abnormal Walk] : normal gait [Balance] : balance was intact [2+] : Ankle jerk right 2+ [Oriented To Time, Place, And Person] : oriented to person, place, and time [Past-pointing] : there was no past-pointing [Tremor] : no tremor present [Plantar Reflex Right Only] : normal on the right [Plantar Reflex Left Only] : normal on the left [FreeTextEntry5] : Negative glabellar sign [FreeTextEntry6] : Mild cogwheel rigidity bilaterally. significant postural/action tremor worse on left. Tremor gets better when holding an object with 2 hands instead of 1 [PERRL With Normal Accommodation] : pupils were equal in size, round, reactive to light, with normal accommodation [Hearing Threshold Finger Rub Not [place]] : hearing was normal [Extraocular Movements] : extraocular movements were intact [Neck Appearance] : the appearance of the neck was normal [No Spinal Tenderness] : no spinal tenderness [Pill Rolling Tremor - Right Hand] : not observed on the right [Pill Rolling Tremor - Left Hand] : not observed on the left [Action Tremor - Right Side] : observed on the right [Action Tremor - Left Side] : observed on the left

## 2024-03-26 NOTE — HISTORY OF PRESENT ILLNESS
[FreeTextEntry1] : Original Presentation : It's a pleasure to see Mr. DIANNA CARRENO In the office today. He is a 63 year - old man who presents to the office today for the evaluation of bilateral hand tremor most awfully seen as action as well as postural tremor. He reports that this has been going on for at least a year and while it is annoying, it does not interfere with his activities. He denies any slowness of movement, gait instability. He does drink 3 to 4 glasses of wine daily, and has noticed after drinking the tremor does get better. He is currently on atenolol for blood pressure control   Diagnostic Testing :   MRI Brain w/o 2.27.24 : 6mm hemangioma within the left parietal bone near midline. Mild cerebral volume loss with ex vacuo dilation of the ventricular system. Few scattered T2 hyperintensities likely chronic microvascular ischemic disease. 6mm cystic lesion within the left nasopharynx likely a mucous retention cyst.    Today : Today I had the pleasure of seeing Mr. Carreno in follow up. Their prior imaging and testing have been reviewed.   Patient remains under our care for evaluation of bilateral hand tremor . This is a chronic condition for which he is receiving treatment for. Patient and wife endorse first noticing a bilateral hand tremor 2 years ago that has since  progressed in intensity. Patients tremor is present with action only and is most severe on his left making ADLs difficult to perform. Today we reviewed the results of his MRI of the Brain noted above. MRI did not reveal pathology to explain the patients symptoms.  Patient and wife deny cognitive changes or any alterations in mood, affect personality. He does have a history of chronic low back pain which he states makes ambulation difficult sometimes which can look like cogwheel rigidity. Today we discussed getting a Isaias Scan of the Brain to rule out Parkinsons disease to which patient and wife agreed.

## 2024-04-18 ENCOUNTER — OUTPATIENT (OUTPATIENT)
Dept: OUTPATIENT SERVICES | Facility: HOSPITAL | Age: 64
LOS: 1 days | End: 2024-04-18
Payer: MEDICARE

## 2024-04-18 DIAGNOSIS — G25.0 ESSENTIAL TREMOR: ICD-10-CM

## 2024-04-18 PROCEDURE — 78803 RP LOCLZJ TUM SPECT 1 AREA: CPT

## 2024-04-18 PROCEDURE — 78803 RP LOCLZJ TUM SPECT 1 AREA: CPT | Mod: 26,MH

## 2024-04-18 PROCEDURE — A9584: CPT

## 2024-04-19 DIAGNOSIS — G25.0 ESSENTIAL TREMOR: ICD-10-CM

## 2024-04-23 ENCOUNTER — APPOINTMENT (OUTPATIENT)
Dept: NEUROLOGY | Facility: CLINIC | Age: 64
End: 2024-04-23
Payer: MEDICARE

## 2024-04-23 VITALS — BODY MASS INDEX: 36.57 KG/M2 | WEIGHT: 270 LBS | HEIGHT: 72 IN

## 2024-04-23 DIAGNOSIS — G25.0 ESSENTIAL TREMOR: ICD-10-CM

## 2024-04-23 DIAGNOSIS — I67.9 CEREBROVASCULAR DISEASE, UNSPECIFIED: ICD-10-CM

## 2024-04-23 PROCEDURE — 99213 OFFICE O/P EST LOW 20 MIN: CPT

## 2024-04-23 NOTE — HISTORY OF PRESENT ILLNESS
[FreeTextEntry1] : Original Presentation : It's a pleasure to see Mr. DIANNA CARRENO In the office today. He is a 63 year - old man who presents to the office today for the evaluation of bilateral hand tremor most awfully seen as action as well as postural tremor. He reports that this has been going on for at least a year and while it is annoying, it does not interfere with his activities. He denies any slowness of movement, gait instability. He does drink 3 to 4 glasses of wine daily, and has noticed after drinking the tremor does get better. He is currently on atenolol for blood pressure control   Diagnostic Testing :   MRI Brain w/o 2.27.24 : 6mm hemangioma within the left parietal bone near midline. Mild cerebral volume loss with ex vacuo dilation of the ventricular system. Few scattered T2 hyperintensities likely chronic microvascular ischemic disease. 6mm cystic lesion within the left nasopharynx likely a mucous retention cyst.   Brain DaTscan 4.18.24 : IMPRESSION: Normal Ioflupane brain SPECT images. No characteristic findings of Parkinson's syndrome on Ioflupane SPECT imaging --- End of Report ---  Today : Today I had the pleasure of seeing Mr. Carreno in follow up. Their prior imaging and testing have been reviewed.   Patient remains under our care for evaluation of bilateral hand tremor . This is a chronic condition for which he is receiving treatment for. Patient and wife endorse first noticing a bilateral hand tremor 2 years ago that has since  progressed in intensity. Patients tremor is present with action only and is most severe on his left making ADLs difficult to perform.  MRI of the Brain revealed chronic microvascular changes full report noted above. Today we reviewed his Brain DaTscan which resulted normal and did NOT indicate Parkinsons syndrome or disease. Patient is under the care of cardiologist Dr. Ellis. At his last visit we discussed following up with cardiology to rule out cardiac etiology for tremor such as arrhythmia but patient has yet to do so. Today we discussed Propranolol as an option of treatment for his tremor however patient is currently on Atenolol so he will have to speak with his cardiologist first if he wishes to peruse treatment.    .

## 2024-04-23 NOTE — ASSESSMENT
[FreeTextEntry1] : 63 year old male with bilateral hand tremor.  MRI of the Brain noted cerebrovascular disease likely related to his hx of HTN and HLD. Today we reviewed his Cornelia Scan of the Brain which was negative for Parkinsons Disease. Patient will f/u with his cardiologist regarding HN, HLD and option of Propranolol instead of Atenolol for his tremor and to first  r/o potential cardiac etiology. Patient will return to the office after seeing the above providers   Supervising Physician : Kraig Carvajal MD

## 2024-04-23 NOTE — PHYSICAL EXAM
[Oriented To Time, Place, And Person] : oriented to person, place, and time [Person] : oriented to person [Place] : oriented to place [Time] : oriented to time [Concentration Intact] : normal concentrating ability [Visual Intact] : visual attention was ~T not ~L decreased [Naming Objects] : no difficulty naming common objects [Repeating Phrases] : no difficulty repeating a phrase [Writing A Sentence] : no difficulty writing a sentence [Fluency] : fluency intact [Comprehension] : comprehension intact [Reading] : reading intact [Past History] : adequate knowledge of personal past history [Cranial Nerves Optic (II)] : visual acuity intact bilaterally,  visual fields full to confrontation, pupils equal round and reactive to light [Cranial Nerves Oculomotor (III)] : extraocular motion intact [Cranial Nerves Trigeminal (V)] : facial sensation intact symmetrically [Cranial Nerves Facial (VII)] : face symmetrical [Cranial Nerves Vestibulocochlear (VIII)] : hearing was intact bilaterally [Cranial Nerves Glossopharyngeal (IX)] : tongue and palate midline [Cranial Nerves Accessory (XI - Cranial And Spinal)] : head turning and shoulder shrug symmetric [Cranial Nerves Hypoglossal (XII)] : there was no tongue deviation with protrusion [Motor Tone] : muscle tone was normal in all four extremities [Motor Strength] : muscle strength was normal in all four extremities [No Muscle Atrophy] : normal bulk in all four extremities [Sensation Tactile Decrease] : light touch was intact [Abnormal Walk] : normal gait [Balance] : balance was intact [2+] : Ankle jerk left 2+ [PERRL With Normal Accommodation] : pupils were equal in size, round, reactive to light, with normal accommodation [Extraocular Movements] : extraocular movements were intact [Hearing Threshold Finger Rub Not [place]] : hearing was normal [Neck Appearance] : the appearance of the neck was normal [No Spinal Tenderness] : no spinal tenderness [Action Tremor - Right Side] : observed on the right [Action Tremor - Left Side] : observed on the left [Past-pointing] : there was no past-pointing [Tremor] : no tremor present [Plantar Reflex Right Only] : normal on the right [Plantar Reflex Left Only] : normal on the left [FreeTextEntry5] : Negative glabellar sign [FreeTextEntry6] : Mild cogwheel rigidity bilaterally. significant postural/action tremor worse on left. Tremor gets better when holding an object with 2 hands instead of 1 [Pill Rolling Tremor - Right Hand] : not observed on the right [Pill Rolling Tremor - Left Hand] : not observed on the left

## 2024-04-24 ENCOUNTER — APPOINTMENT (OUTPATIENT)
Dept: GASTROENTEROLOGY | Facility: CLINIC | Age: 64
End: 2024-04-24
Payer: MEDICARE

## 2024-04-24 VITALS
SYSTOLIC BLOOD PRESSURE: 145 MMHG | OXYGEN SATURATION: 98 % | HEIGHT: 72 IN | BODY MASS INDEX: 36.57 KG/M2 | DIASTOLIC BLOOD PRESSURE: 87 MMHG | WEIGHT: 270 LBS | HEART RATE: 71 BPM

## 2024-04-24 DIAGNOSIS — Z78.9 OTHER SPECIFIED HEALTH STATUS: ICD-10-CM

## 2024-04-24 DIAGNOSIS — R74.01 ELEVATION OF LEVELS OF LIVER TRANSAMINASE LEVELS: ICD-10-CM

## 2024-04-24 DIAGNOSIS — K76.9 LIVER DISEASE, UNSPECIFIED: ICD-10-CM

## 2024-04-24 DIAGNOSIS — Z00.00 ENCOUNTER FOR GENERAL ADULT MEDICAL EXAMINATION W/OUT ABNORMAL FINDINGS: ICD-10-CM

## 2024-04-24 PROCEDURE — 99215 OFFICE O/P EST HI 40 MIN: CPT

## 2024-04-24 PROCEDURE — G2211 COMPLEX E/M VISIT ADD ON: CPT

## 2024-04-24 RX ORDER — HEPATITIS B VACCINE (RECOMBINANT) ADJUVANTED 20 UG/.5ML
20 INJECTION, SOLUTION INTRAMUSCULAR
Qty: 1 | Refills: 1 | Status: ACTIVE | COMMUNITY
Start: 2024-04-24 | End: 1900-01-01

## 2024-04-25 PROBLEM — R74.01 ELEVATED TRANSAMINASE LEVEL: Status: ACTIVE | Noted: 2024-03-13

## 2024-04-25 PROBLEM — K76.9 LIVER LESION, RIGHT LOBE: Status: ACTIVE | Noted: 2024-03-13

## 2024-04-25 PROBLEM — Z78.9 CURRENT NON-SMOKER: Status: ACTIVE | Noted: 2024-03-13

## 2024-04-25 PROBLEM — Z00.00 ENCOUNTER FOR PREVENTIVE HEALTH EXAMINATION: Status: ACTIVE | Noted: 2024-02-20

## 2024-04-25 RX ORDER — COLCHICINE 0.6 MG/1
0.6 TABLET ORAL
Qty: 90 | Refills: 0 | Status: ACTIVE | COMMUNITY
Start: 2024-04-08

## 2024-04-25 RX ORDER — FEBUXOSTAT 40 MG/1
40 TABLET ORAL
Qty: 90 | Refills: 0 | Status: ACTIVE | COMMUNITY
Start: 2024-04-08

## 2024-04-25 RX ORDER — NIFEDIPINE 60 MG/1
60 TABLET, FILM COATED, EXTENDED RELEASE ORAL
Qty: 90 | Refills: 0 | Status: ACTIVE | COMMUNITY
Start: 2023-05-19

## 2024-04-25 RX ORDER — PANTOPRAZOLE 40 MG/1
40 TABLET, DELAYED RELEASE ORAL
Qty: 90 | Refills: 0 | Status: ACTIVE | COMMUNITY
Start: 2023-10-17

## 2024-04-25 RX ORDER — METHYLPREDNISOLONE 4 MG/1
4 TABLET ORAL
Qty: 21 | Refills: 0 | Status: ACTIVE | COMMUNITY
Start: 2024-04-04

## 2024-04-25 RX ORDER — LEVOTHYROXINE SODIUM 0.03 MG/1
25 TABLET ORAL
Qty: 90 | Refills: 0 | Status: ACTIVE | COMMUNITY
Start: 2024-03-15

## 2024-04-25 NOTE — PHYSICAL EXAM
[General Appearance - Alert] : alert [General Appearance - Well Nourished] : well nourished [General Appearance - Well Developed] : well developed [Sclera] : the sclera and conjunctiva were normal [Hearing Threshold Finger Rub Not [place]] : hearing was normal [Neck Appearance] : the appearance of the neck was normal [Neck Cervical Mass (___cm)] : no neck mass was observed [Jugular Venous Distention Increased] : there was no jugular-venous distention [Thyroid Diffuse Enlargement] : the thyroid was not enlarged [Respiration, Rhythm And Depth] : normal respiratory rhythm and effort [Auscultation Breath Sounds / Voice Sounds] : lungs were clear to auscultation bilaterally [Heart Sounds] : normal S1 and S2 [Murmurs] : no murmurs [Edema] : there was no peripheral edema [Abdomen Soft] : soft [Abdomen Tenderness] : non-tender [Cervical Lymph Nodes Enlarged Posterior Bilaterally] : posterior cervical [Cervical Lymph Nodes Enlarged Anterior Bilaterally] : anterior cervical [Supraclavicular Lymph Nodes Enlarged Bilaterally] : supraclavicular [Nail Clubbing] : no clubbing  or cyanosis of the fingernails [No Focal Deficits] : no focal deficits [FreeTextEntry1] : Tremors + [Oriented To Time, Place, And Person] : oriented to person, place, and time

## 2024-04-25 NOTE — REVIEW OF SYSTEMS
[Fever] : no fever [Chills] : no chills [Recent Weight Loss (___ Lbs)] : no recent weight loss [Eye Pain] : no eye pain [Red Eyes] : eyes not red [Earache] : no earache [Loss Of Hearing] : no hearing loss [Chest Pain] : no chest pain [Palpitations] : no palpitations [Cough] : no cough [SOB on Exertion] : no shortness of breath during exertion [As Noted in HPI] : as noted in HPI [Change In A Mole] : no change in a mole [Confused] : no confusion [Convulsions] : no convulsions [Easy Bleeding] : no tendency for easy bleeding [Easy Bruising] : no tendency for easy bruising [de-identified] : Sees dermatology every year.

## 2024-04-25 NOTE — ASSESSMENT
[FreeTextEntry1] : 63 y o  M with obesity HTN elevated TG CAD gout tremors seen for steatosis liver disease and liver lesion  Liver lesion - ? hemangioma 2 cm L lobe liver lesion on US Jan 2024. US abdomen 1.6 cm R hepatic lobe lesion. No contrast study on system radiology reporting previously determined as hemangioma. Unclear if 2 different lesions Will get MRI liver - ordered not done yet.   Elevated transaminases - likely Met ALD but positive ASMA ? AIH Chronic RUQ pain. New tremors  Obesity BMI 38 HTN elevated TG gout  Had elevation of liver tests on system since 2019 With   improved since With AST ALT in 40 - 80 range. Repeat AST 37 ALT 51 Normal platelet A1AT - MM HBsAg negative HCV ab negative DULCE AMA negative Ig G normal. ASMA 1:80 Ceruloplasmin 26, random copper done by lab instead of 24 hour urine was normal.  US showing steatosis and hepatomegaly Fibroscan -  S3 steatosis LS 5.8 kPA F0 fibrosis Fbrosure A1AT ceruloplasmin HCV autoimmune work up  Treatment Nutrition - low carb high protein / Mediterranean diet Limit red meat Exercise 5 days a week with aerobic activity 30 - 45 minutes 3 day and non aerobic activity like weights, resistance 2 times a week. Advised  alcohol abstinence. Consider Vit E and semaglutide after liver biopsy   Health maintenance HBsAg HCV ab negative Does not have immunity for hepatitis A and B and ordered vaccination.  Colonoscopy - done at [facility] in last 8 months was normal.

## 2024-04-25 NOTE — HISTORY OF PRESENT ILLNESS
[___ Month(s) Ago] : [unfilled] month(s) ago [___] : Performed [unfilled] [de-identified] : 3/13/2024 [de-identified] : Had US done in 2019 for RUQ pain. Reports having RUQ discomfort daily constant non radiating with no increasing or reducing factors. Reports no relation to food position or BM. No other symptoms. US in 2019 showed 1.6 cm R lobe indeterminate mass while US in 2024 Jan reporting 2 cm L lobe lesion. Had fatty liver for years. Has had elevated liver enzymes on records since 2019.  . Since 2023 AST ALT 40 - 80 range. Had GERD in the past and symptoms are controlled with PPI. On statin since 2013 for CAD. Max wt 295 lb. Was overweight for more than 5 years. Lost 25 lb in past year with diet changes. Hx of HTN elevated TG Gout DJD spine Has been having tremors and seen neurology. Was in ER with chest pain  Reports having attack of gout and is going to start on colchicine and febuxostat.  No jaundice confusion hematemesis or melena. No dysphagia odynophagia No diarrhea constipation.   [de-identified] : AST 34 ALT 51 T bili 0.7 crat 0.9 IG A 477 Ig G 1354 Ig M 104 DULCE negative ASMA 1:80 AMA negative Random urine copper 9 A1AT - MM,  INR 1.1 Plat 245 Hb 15 Fe sat 32 % TTG Ig A < 1 ceruloplasmi 26 HBSab negative HBcAb total negative HBsAg negative HCV ab negaive Hepatitis A total negative AFP .27 ACE 32  [FreeTextEntry1] : Retired Former  and then  and now on disability Lives with wife Has 2 children Alcohol 4 glasses wine per day Non smoker No drugs  No family hx of liver disease or liver cancer   Pertinent Labs: WBC 8 Hb 13.9 PLt 163 AST 39 ALT 50  T michel 0.5, Performed May 2023   Ultrasound: Indeterminate right hepatic lobe mass measuring 1.6 x 1.2 x 1.3 cm. The background liver parenchyma is echogenic, most consistent with hepatic steatosis.  No gallstones or definite acute findings., Performed 2019.   US abdomen Jan 2025 Liver 17.9 cm Hypoechoic lesion 2 cm lesion L lobe  Oct 2019   ALP 85 Jan 2023 AST 59 ALT 86 ALP 94 Feb 2023 AST 39 ALT 62 Nov 2023 HBa1c 5.2 TSH 4.4  LDL 89 AST 43 ALT 40 PLt 230  2023 CT coronary angio Diminutive left circumflex artery containing a focal area of severe stenosis proximally with nonvisualization distally, which may be due to occlusion. Multiple focal areas of diminutive density calcified vessel limiting intraluminal evaluation. The total Agatston coronary artery calcium score equals 236, which corresponds to 75th percentile for age, gender and ethnicity. CAD-RADS 4A/5N.  Cardiac cath Coronary Dominance: right LM: minor irregularities LAD: mild disease. 40% ostial D1 CX: severe distal disease, small vessel. 40% OM1 stenosis RCA: mild disease in RPDA. Minor irregularities in remainder

## 2024-05-01 ENCOUNTER — OUTPATIENT (OUTPATIENT)
Dept: OUTPATIENT SERVICES | Facility: HOSPITAL | Age: 64
LOS: 1 days | End: 2024-05-01
Payer: MEDICARE

## 2024-05-01 VITALS
HEART RATE: 63 BPM | OXYGEN SATURATION: 99 % | DIASTOLIC BLOOD PRESSURE: 73 MMHG | TEMPERATURE: 98 F | SYSTOLIC BLOOD PRESSURE: 153 MMHG | WEIGHT: 265 LBS | HEIGHT: 73 IN | RESPIRATION RATE: 16 BRPM

## 2024-05-01 DIAGNOSIS — K76.0 FATTY (CHANGE OF) LIVER, NOT ELSEWHERE CLASSIFIED: ICD-10-CM

## 2024-05-01 DIAGNOSIS — R74.01 ELEVATION OF LEVELS OF LIVER TRANSAMINASE LEVELS: ICD-10-CM

## 2024-05-01 DIAGNOSIS — Z98.890 OTHER SPECIFIED POSTPROCEDURAL STATES: Chronic | ICD-10-CM

## 2024-05-01 DIAGNOSIS — Z01.818 ENCOUNTER FOR OTHER PREPROCEDURAL EXAMINATION: ICD-10-CM

## 2024-05-01 LAB
ALBUMIN SERPL ELPH-MCNC: 4.8 G/DL — SIGNIFICANT CHANGE UP (ref 3.5–5.2)
ALP SERPL-CCNC: 88 U/L — SIGNIFICANT CHANGE UP (ref 30–115)
ALT FLD-CCNC: 54 U/L — HIGH (ref 0–41)
ANION GAP SERPL CALC-SCNC: 18 MMOL/L — HIGH (ref 7–14)
APTT BLD: 32.2 SEC — SIGNIFICANT CHANGE UP (ref 27–39.2)
AST SERPL-CCNC: 41 U/L — SIGNIFICANT CHANGE UP (ref 0–41)
BASOPHILS # BLD AUTO: 0.06 K/UL — SIGNIFICANT CHANGE UP (ref 0–0.2)
BASOPHILS NFR BLD AUTO: 0.7 % — SIGNIFICANT CHANGE UP (ref 0–1)
BILIRUB SERPL-MCNC: 0.8 MG/DL — SIGNIFICANT CHANGE UP (ref 0.2–1.2)
BUN SERPL-MCNC: 11 MG/DL — SIGNIFICANT CHANGE UP (ref 10–20)
CALCIUM SERPL-MCNC: 9.7 MG/DL — SIGNIFICANT CHANGE UP (ref 8.4–10.5)
CHLORIDE SERPL-SCNC: 100 MMOL/L — SIGNIFICANT CHANGE UP (ref 98–110)
CO2 SERPL-SCNC: 20 MMOL/L — SIGNIFICANT CHANGE UP (ref 17–32)
CREAT SERPL-MCNC: 0.9 MG/DL — SIGNIFICANT CHANGE UP (ref 0.7–1.5)
EGFR: 95 ML/MIN/1.73M2 — SIGNIFICANT CHANGE UP
EOSINOPHIL # BLD AUTO: 0.18 K/UL — SIGNIFICANT CHANGE UP (ref 0–0.7)
EOSINOPHIL NFR BLD AUTO: 2.2 % — SIGNIFICANT CHANGE UP (ref 0–8)
GLUCOSE SERPL-MCNC: 90 MG/DL — SIGNIFICANT CHANGE UP (ref 70–99)
HCT VFR BLD CALC: 44.3 % — SIGNIFICANT CHANGE UP (ref 42–52)
HCV AB S/CO SERPL IA: 0.06 COI — SIGNIFICANT CHANGE UP
HCV AB SERPL-IMP: SIGNIFICANT CHANGE UP
HGB BLD-MCNC: 15.4 G/DL — SIGNIFICANT CHANGE UP (ref 14–18)
IMM GRANULOCYTES NFR BLD AUTO: 0.2 % — SIGNIFICANT CHANGE UP (ref 0.1–0.3)
INR BLD: 1.05 RATIO — SIGNIFICANT CHANGE UP (ref 0.65–1.3)
LYMPHOCYTES # BLD AUTO: 1.01 K/UL — LOW (ref 1.2–3.4)
LYMPHOCYTES # BLD AUTO: 12.3 % — LOW (ref 20.5–51.1)
MCHC RBC-ENTMCNC: 33.8 PG — HIGH (ref 27–31)
MCHC RBC-ENTMCNC: 34.8 G/DL — SIGNIFICANT CHANGE UP (ref 32–37)
MCV RBC AUTO: 97.1 FL — HIGH (ref 80–94)
MONOCYTES # BLD AUTO: 1 K/UL — HIGH (ref 0.1–0.6)
MONOCYTES NFR BLD AUTO: 12.2 % — HIGH (ref 1.7–9.3)
NEUTROPHILS # BLD AUTO: 5.96 K/UL — SIGNIFICANT CHANGE UP (ref 1.4–6.5)
NEUTROPHILS NFR BLD AUTO: 72.4 % — SIGNIFICANT CHANGE UP (ref 42.2–75.2)
NRBC # BLD: 0 /100 WBCS — SIGNIFICANT CHANGE UP (ref 0–0)
PLATELET # BLD AUTO: 244 K/UL — SIGNIFICANT CHANGE UP (ref 130–400)
PMV BLD: 10 FL — SIGNIFICANT CHANGE UP (ref 7.4–10.4)
POTASSIUM SERPL-MCNC: 4.5 MMOL/L — SIGNIFICANT CHANGE UP (ref 3.5–5)
POTASSIUM SERPL-SCNC: 4.5 MMOL/L — SIGNIFICANT CHANGE UP (ref 3.5–5)
PROT SERPL-MCNC: 7.7 G/DL — SIGNIFICANT CHANGE UP (ref 6–8)
PROTHROM AB SERPL-ACNC: 12 SEC — SIGNIFICANT CHANGE UP (ref 9.95–12.87)
RBC # BLD: 4.56 M/UL — LOW (ref 4.7–6.1)
RBC # FLD: 12.4 % — SIGNIFICANT CHANGE UP (ref 11.5–14.5)
SODIUM SERPL-SCNC: 138 MMOL/L — SIGNIFICANT CHANGE UP (ref 135–146)
WBC # BLD: 8.23 K/UL — SIGNIFICANT CHANGE UP (ref 4.8–10.8)
WBC # FLD AUTO: 8.23 K/UL — SIGNIFICANT CHANGE UP (ref 4.8–10.8)

## 2024-05-01 PROCEDURE — 85730 THROMBOPLASTIN TIME PARTIAL: CPT

## 2024-05-01 PROCEDURE — 93005 ELECTROCARDIOGRAM TRACING: CPT

## 2024-05-01 PROCEDURE — 86803 HEPATITIS C AB TEST: CPT

## 2024-05-01 PROCEDURE — 93010 ELECTROCARDIOGRAM REPORT: CPT

## 2024-05-01 PROCEDURE — 80053 COMPREHEN METABOLIC PANEL: CPT

## 2024-05-01 PROCEDURE — 36415 COLL VENOUS BLD VENIPUNCTURE: CPT

## 2024-05-01 PROCEDURE — 99214 OFFICE O/P EST MOD 30 MIN: CPT | Mod: 25

## 2024-05-01 PROCEDURE — 85025 COMPLETE CBC W/AUTO DIFF WBC: CPT

## 2024-05-01 PROCEDURE — 85610 PROTHROMBIN TIME: CPT

## 2024-05-01 RX ORDER — VALSARTAN 80 MG/1
1 TABLET ORAL
Qty: 0 | Refills: 0 | DISCHARGE

## 2024-05-01 RX ORDER — FAMOTIDINE 10 MG/ML
1 INJECTION INTRAVENOUS
Refills: 0 | DISCHARGE

## 2024-05-01 RX ORDER — PANTOPRAZOLE SODIUM 20 MG/1
1 TABLET, DELAYED RELEASE ORAL
Qty: 0 | Refills: 0 | DISCHARGE

## 2024-05-01 RX ORDER — ATENOLOL 25 MG/1
1 TABLET ORAL
Qty: 0 | Refills: 0 | DISCHARGE

## 2024-05-01 RX ORDER — NIFEDIPINE 30 MG
1 TABLET, EXTENDED RELEASE 24 HR ORAL
Refills: 0 | DISCHARGE

## 2024-05-01 NOTE — H&P PST ADULT - NSICDXPASTMEDICALHX_GEN_ALL_CORE_FT
PAST MEDICAL HISTORY:  Chronic GERD     Hypertension, unspecified type      PAST MEDICAL HISTORY:  Chronic GERD     HLD (hyperlipidemia)     Hypertension, unspecified type     Obesity

## 2024-05-01 NOTE — H&P PST ADULT - REASON FOR ADMISSION
3 = assistive equipment and person
Case Type: OP  Suite: Interventional Radiology  Proceduralist: Shashi Joseph  Confirmed Surgery Date Time: 05-   PAST Date Time: 05- - 9:15  Procedure: Non target Liver Core biopsy  Laterality: N/A  Length of Procedure: 60 Minutes  Anesthesia Type: Local Standby

## 2024-05-01 NOTE — H&P PST ADULT - WEIGHT IN LBS
Pt here for Depo injection.  Received in right deltoid and eagle well.     Next dose due 8-3-21 to 8-17-21.    264.9

## 2024-05-01 NOTE — H&P PST ADULT - HISTORY OF PRESENT ILLNESS
62 y/o M with PMH HTN, GERD, liver mass 1.6cm ( 2019), High LFT's who presents to presenting to pretesting for the preparations on the Non target liver biopsy due to sonogram done on 01/2024 for F/U and IT shown 2 cm Left lobe lesion.   Patient denies any cp, sob, palpitations, fever, cough, URI, abdominal pains, N/V, UTI, Rashes or open wounds.  As per patient exercise tolerance of 1 fos walks with out sob  Patient denies any s/s covid 19 and reports no contact with known positive people. Patient  instructed to continue to self monitor and report any concerns to MD. Pt will continue to practice self isolation and  exposure control measures pre op  Anesthesia Alert  NO--Difficult Airway  NO--History of neck surgery or radiation  NO--Limited ROM of neck  NO--History of Malignant hyperthermia  NO--Personal or family history of Pseudocholinesterase deficiency  NO--Prior Anesthesia Complication  NO--Latex Allergy  NO--Loose teeth  NO--History of Rheumatoid Arthritis  NO--WANG  NO Risk of bleedings   Pt instructed to stop vitamins/supplements/herbal medications for one week prior to surgery  Duke Activity Status Index (DASI) from [employer]  on 5/1/2024  ** All calculations should be rechecked by clinician prior to use **  RESULT SUMMARY:  32.2 points  The higher the score (maximum 58.2), the higher the functional status.  6.70 METs  INPUTS:  Take care of self —> 2.75 = Yes  Walk indoors —> 1.75 = Yes  Walk 1&ndash;2 blocks on level ground —> 2.75 = Yes  Climb a flight of stairs or walk up a hill —> 5.5 = Yes  Run a short distance —> 8 = Yes  Do light work around the house —> 2.7 = Yes  Do moderate work around the house —> 3.5 = Yes  Do heavy work around the house —> 0 = No  Do yard work —> 0 = No  Have sexual relations —> 5.25 = Yes  Participate in moderate recreational activities —> 0 = No  Participate in strenuous sports —> 0 = No  Revised Cardiac Risk Index for Pre-Operative Risk from [employer]  on 5/1/2024  ** All calculations should be rechecked by clinician prior to use **  RESULT SUMMARY:  1 points  Class II Risk  6.0 %  30-day risk of death, MI, or cardiac arrest  From [employer] 2017. These numbers are higher than those from the original study (Neymar 1999). See Evidence for details.  INPUTS:  Elevated-risk surgery —> 1 = Yes  History of ischemic heart disease —> 0 = No  History of congestive heart failure —> 0 = No  History of cerebrovascular disease —> 0 = No  Pre-operative treatment with insulin —> 0 = No  Pre-operative creatinine >2 mg/dL / 176.8 µmol/L —> 0 = No     62 y/o M with PMH HTN, GERD, liver mass 1.6cm ( 2019), High LFT's who presents to PAST for the preparations of Non target liver biopsy due to high LFTs, fatty liver and F/U of  liver mass of 1.6 cm       (2017) and recent sonogram done on 01/2024 and it shown 2 cm Left lobe lesion.   Patient denies any cp, sob, palpitations, fever, cough, URI, abdominal pains, N/V, UTI, Rashes or open wounds.  As per patient exercise tolerance of 1 fos walks with out sob  Patient denies any s/s covid 19 and reports no contact with known positive people. Patient  instructed to continue to self monitor and report any concerns to MD. Pt will continue to practice self isolation and  exposure control measures pre op  Anesthesia Alert  Class IV-Difficult Airway  NO--History of neck surgery or radiation  NO--Limited ROM of neck  NO--History of Malignant hyperthermia  NO--Personal or family history of Pseudocholinesterase deficiency  NO--Prior Anesthesia Complication  NO--Latex Allergy  NO--Loose teeth  NO--History of Rheumatoid Arthritis  YES--WANG ?? Never tested. Positive symptoms   NO Risk of bleedings   Pt instructed to stop vitamins/supplements/herbal medications for one week prior to surgery  Duke Activity Status Index (DASI) from [employer]  on 5/1/2024  ** All calculations should be rechecked by clinician prior to use **  RESULT SUMMARY:  32.2 points  The higher the score (maximum 58.2), the higher the functional status.  6.70 METs  INPUTS:  Take care of self —> 2.75 = Yes  Walk indoors —> 1.75 = Yes  Walk 1&ndash;2 blocks on level ground —> 2.75 = Yes  Climb a flight of stairs or walk up a hill —> 5.5 = Yes  Run a short distance —> 8 = Yes  Do light work around the house —> 2.7 = Yes  Do moderate work around the house —> 3.5 = Yes  Do heavy work around the house —> 0 = No  Do yard work —> 0 = No  Have sexual relations —> 5.25 = Yes  Participate in moderate recreational activities —> 0 = No  Participate in strenuous sports —> 0 = No  Revised Cardiac Risk Index for Pre-Operative Risk from [employer]  on 5/1/2024  ** All calculations should be rechecked by clinician prior to use **  RESULT SUMMARY:  1 points  Class II Risk  6.0 %  30-day risk of death, MI, or cardiac arrest  From [employer] 2017. These numbers are higher than those from the original study (Neymar 1999). See Evidence for details.  INPUTS:  Elevated-risk surgery —> 1 = Yes  History of ischemic heart disease —> 0 = No  History of congestive heart failure —> 0 = No  History of cerebrovascular disease —> 0 = No  Pre-operative treatment with insulin —> 0 = No  Pre-operative creatinine >2 mg/dL / 176.8 µmol/L —> 0 = No     62 y/o M with PMH HTN, GERD, liver mass 1.6cm ( 2019), High LFT's who presents to PAST for the preparations of Non target liver biopsy due to high LFTs, fatty liver and F/U of  liver mass of 1.6 cm       (2017) and recent sonogram done on 01/2024 and it shown 2 cm Left lobe lesion.   Patient denies any cp, sob, palpitations, fever, cough, URI, abdominal pains, N/V, UTI, Rashes or open wounds.  As per patient exercise tolerance of 1 fos walks with out sob  Patient denies any s/s covid 19 and reports no contact with known positive people. Patient  instructed to continue to self monitor and report any concerns to MD. Pt will continue to practice self isolation and  exposure control measures pre op  Anesthesia Alert  Class IV-Difficult Airway  NO--History of neck surgery or radiation  NO--Limited ROM of neck  NO--History of Malignant hyperthermia  NO--Personal or family history of Pseudocholinesterase deficiency  NO--Prior Anesthesia Complication  NO--Latex Allergy  NO--Loose teeth  NO--History of Rheumatoid Arthritis  YES--WANG ?? Never tested. Positive symptoms   NO Risk of bleedings   B/L Tremors   Pt instructed to stop vitamins/supplements/herbal medications for one week prior to surgery  Duke Activity Status Index (DASI) from [employer]  on 5/1/2024  ** All calculations should be rechecked by clinician prior to use **  RESULT SUMMARY:  32.2 points  The higher the score (maximum 58.2), the higher the functional status.  6.70 METs  INPUTS:  Take care of self —> 2.75 = Yes  Walk indoors —> 1.75 = Yes  Walk 1&ndash;2 blocks on level ground —> 2.75 = Yes  Climb a flight of stairs or walk up a hill —> 5.5 = Yes  Run a short distance —> 8 = Yes  Do light work around the house —> 2.7 = Yes  Do moderate work around the house —> 3.5 = Yes  Do heavy work around the house —> 0 = No  Do yard work —> 0 = No  Have sexual relations —> 5.25 = Yes  Participate in moderate recreational activities —> 0 = No  Participate in strenuous sports —> 0 = No  Revised Cardiac Risk Index for Pre-Operative Risk from [employer]  on 5/1/2024  ** All calculations should be rechecked by clinician prior to use **  RESULT SUMMARY:  1 points  Class II Risk  6.0 %  30-day risk of death, MI, or cardiac arrest  From [employer] 2017. These numbers are higher than those from the original study (Neymar 1999). See Evidence for details.  INPUTS:  Elevated-risk surgery —> 1 = Yes  History of ischemic heart disease —> 0 = No  History of congestive heart failure —> 0 = No  History of cerebrovascular disease —> 0 = No  Pre-operative treatment with insulin —> 0 = No  Pre-operative creatinine >2 mg/dL / 176.8 µmol/L —> 0 = No

## 2024-05-01 NOTE — H&P PST ADULT - RESPIRATORY AND THORAX
Administrative documentation to document COVID-19 status.  COVID-19 status: Most recent Covid-19 test positive    Onset of Symptom Date:  11/30/21  
From: Veronica Jones  To: Edward Hoff  Sent: 11/29/2021 7:36 AM CST  Subject: How to add home Covid test    I tested positive for covid on Friday with two at home tests. My symptoms began on Tuesday, which is when our family began to quarantine. Alan began having symptoms on Friday, Jaime and Rose on Saturday. Our symptoms have been manageable, and I am feeling almost back to normal, except for a slight cough. Alan also feels well enough to work from home today. We will continue to quarantine for the full 10 days. I am wondering how we add covid to our medical record if we have only taken an at home test? I understand that we would likely test positive for awhile, so I want to make sure that we would also have medical evidence of recovery. Thank you!  
details…

## 2024-05-02 DIAGNOSIS — R74.01 ELEVATION OF LEVELS OF LIVER TRANSAMINASE LEVELS: ICD-10-CM

## 2024-05-02 DIAGNOSIS — Z01.818 ENCOUNTER FOR OTHER PREPROCEDURAL EXAMINATION: ICD-10-CM

## 2024-05-08 ENCOUNTER — OUTPATIENT (OUTPATIENT)
Dept: OUTPATIENT SERVICES | Facility: HOSPITAL | Age: 64
LOS: 1 days | Discharge: HOME | End: 2024-05-08
Payer: MEDICARE

## 2024-05-08 ENCOUNTER — RESULT REVIEW (OUTPATIENT)
Age: 64
End: 2024-05-08

## 2024-05-08 ENCOUNTER — TRANSCRIPTION ENCOUNTER (OUTPATIENT)
Age: 64
End: 2024-05-08

## 2024-05-08 VITALS
RESPIRATION RATE: 18 BRPM | HEART RATE: 66 BPM | SYSTOLIC BLOOD PRESSURE: 128 MMHG | OXYGEN SATURATION: 97 % | DIASTOLIC BLOOD PRESSURE: 77 MMHG

## 2024-05-08 VITALS
DIASTOLIC BLOOD PRESSURE: 76 MMHG | SYSTOLIC BLOOD PRESSURE: 144 MMHG | WEIGHT: 264.55 LBS | OXYGEN SATURATION: 98 % | TEMPERATURE: 97 F | HEART RATE: 66 BPM | RESPIRATION RATE: 16 BRPM

## 2024-05-08 DIAGNOSIS — K76.0 FATTY (CHANGE OF) LIVER, NOT ELSEWHERE CLASSIFIED: ICD-10-CM

## 2024-05-08 DIAGNOSIS — Z98.890 OTHER SPECIFIED POSTPROCEDURAL STATES: Chronic | ICD-10-CM

## 2024-05-08 DIAGNOSIS — R74.01 ELEVATION OF LEVELS OF LIVER TRANSAMINASE LEVELS: ICD-10-CM

## 2024-05-08 PROCEDURE — 88312 SPECIAL STAINS GROUP 1: CPT

## 2024-05-08 PROCEDURE — 99153 MOD SED SAME PHYS/QHP EA: CPT

## 2024-05-08 PROCEDURE — 88313 SPECIAL STAINS GROUP 2: CPT | Mod: 26

## 2024-05-08 PROCEDURE — 76942 ECHO GUIDE FOR BIOPSY: CPT

## 2024-05-08 PROCEDURE — 76942 ECHO GUIDE FOR BIOPSY: CPT | Mod: 26

## 2024-05-08 PROCEDURE — 47000 NEEDLE BIOPSY OF LIVER PERQ: CPT

## 2024-05-08 PROCEDURE — 88307 TISSUE EXAM BY PATHOLOGIST: CPT | Mod: 26

## 2024-05-08 PROCEDURE — 88313 SPECIAL STAINS GROUP 2: CPT

## 2024-05-08 PROCEDURE — 99152 MOD SED SAME PHYS/QHP 5/>YRS: CPT

## 2024-05-08 PROCEDURE — 88307 TISSUE EXAM BY PATHOLOGIST: CPT

## 2024-05-08 PROCEDURE — 88312 SPECIAL STAINS GROUP 1: CPT | Mod: 26

## 2024-05-08 NOTE — ASU PATIENT PROFILE, ADULT - NSICDXPASTMEDICALHX_GEN_ALL_CORE_FT
PAST MEDICAL HISTORY:  Chronic GERD     HLD (hyperlipidemia)     Hypertension, unspecified type     Obesity

## 2024-05-08 NOTE — PROCEDURE NOTE - PROCEDURE FINDINGS AND DETAILS
3 18 gauge core needle biopsies obtained from right lobe of the liver.  gel foam embolization of track

## 2024-05-08 NOTE — PRE PROCEDURE NOTE - PRE PROCEDURE EVALUATION
PREOPERATIVE DAY OF PROCEDURE EVALUATION:     I have personally seen and examined this patient. I agree with the history and physical which I have reviewed and noted any changes below:     Plan is for non target liver core biopsy    Procedure/ risks/ benefits/ goals/ alternatives were explained. All questions answered. Informed content obtained from patient. Consent placed in chart.

## 2024-05-08 NOTE — ASU PREOP CHECKLIST - IDENTIFICATION BAND VERIFIED
Detail Level: Simple Plan: 2cm x 2cm lprobable Pilar cyst -patient is scheduled Thursday sept 23rd at 3:00pm with Dr. Chevalier for excision. She will contact office if she needs to change her appointment. done

## 2024-05-09 LAB — SURGICAL PATHOLOGY STUDY: SIGNIFICANT CHANGE UP

## 2024-05-11 DIAGNOSIS — R74.01 ELEVATION OF LEVELS OF LIVER TRANSAMINASE LEVELS: ICD-10-CM

## 2024-05-11 DIAGNOSIS — K76.0 FATTY (CHANGE OF) LIVER, NOT ELSEWHERE CLASSIFIED: ICD-10-CM

## 2024-05-13 DIAGNOSIS — R79.89 OTHER SPECIFIED ABNORMAL FINDINGS OF BLOOD CHEMISTRY: ICD-10-CM

## 2024-06-06 ENCOUNTER — APPOINTMENT (OUTPATIENT)
Dept: GASTROENTEROLOGY | Facility: CLINIC | Age: 64
End: 2024-06-06
Payer: MEDICARE

## 2024-06-06 VITALS
BODY MASS INDEX: 36.57 KG/M2 | DIASTOLIC BLOOD PRESSURE: 77 MMHG | WEIGHT: 270 LBS | HEIGHT: 72 IN | SYSTOLIC BLOOD PRESSURE: 125 MMHG | HEART RATE: 68 BPM | OXYGEN SATURATION: 97 %

## 2024-06-06 DIAGNOSIS — E66.9 OBESITY, UNSPECIFIED: ICD-10-CM

## 2024-06-06 DIAGNOSIS — R76.8 OTHER SPECIFIED ABNORMAL IMMUNOLOGICAL FINDINGS IN SERUM: ICD-10-CM

## 2024-06-06 DIAGNOSIS — K76.0 FATTY (CHANGE OF) LIVER, NOT ELSEWHERE CLASSIFIED: ICD-10-CM

## 2024-06-06 DIAGNOSIS — K74.00 HEPATIC FIBROSIS, UNSPECIFIED: ICD-10-CM

## 2024-06-06 DIAGNOSIS — R25.1 TREMOR, UNSPECIFIED: ICD-10-CM

## 2024-06-06 DIAGNOSIS — Z78.9 OTHER SPECIFIED HEALTH STATUS: ICD-10-CM

## 2024-06-06 PROBLEM — E78.5 HYPERLIPIDEMIA, UNSPECIFIED: Chronic | Status: ACTIVE | Noted: 2024-05-01

## 2024-06-06 PROCEDURE — 99215 OFFICE O/P EST HI 40 MIN: CPT

## 2024-06-11 PROBLEM — Z78.9 ALCOHOL USE: Status: ACTIVE | Noted: 2024-03-14

## 2024-06-11 PROBLEM — E66.9 OBESITY (BMI 30-39.9): Status: ACTIVE | Noted: 2024-03-13

## 2024-06-11 PROBLEM — K74.00 LIVER FIBROSIS: Status: ACTIVE | Noted: 2024-06-11

## 2024-06-11 PROBLEM — R25.1 TREMORS OF NERVOUS SYSTEM: Status: ACTIVE | Noted: 2024-03-13

## 2024-06-11 PROBLEM — R76.8 AUTOANTIBODY TITER POSITIVE: Status: ACTIVE | Noted: 2024-04-24

## 2024-06-11 PROBLEM — K76.0 HEPATIC STEATOSIS: Status: ACTIVE | Noted: 2024-03-13

## 2024-06-11 NOTE — PHYSICAL EXAM
[General Appearance - Alert] : alert [General Appearance - Well Nourished] : well nourished [General Appearance - Well Developed] : well developed [Sclera] : the sclera and conjunctiva were normal [Hearing Threshold Finger Rub Not [place]] : hearing was normal [Neck Appearance] : the appearance of the neck was normal [Neck Cervical Mass (___cm)] : no neck mass was observed [Jugular Venous Distention Increased] : there was no jugular-venous distention [Thyroid Diffuse Enlargement] : the thyroid was not enlarged [Respiration, Rhythm And Depth] : normal respiratory rhythm and effort [Auscultation Breath Sounds / Voice Sounds] : lungs were clear to auscultation bilaterally [Heart Sounds] : normal S1 and S2 [Murmurs] : no murmurs [Edema] : there was no peripheral edema [Abdomen Soft] : soft [Abdomen Tenderness] : non-tender [Cervical Lymph Nodes Enlarged Posterior Bilaterally] : posterior cervical [Cervical Lymph Nodes Enlarged Anterior Bilaterally] : anterior cervical [Supraclavicular Lymph Nodes Enlarged Bilaterally] : supraclavicular [Nail Clubbing] : no clubbing  or cyanosis of the fingernails [No Focal Deficits] : no focal deficits [FreeTextEntry1] : Tremors + [Oriented To Time, Place, And Person] : oriented to person, place, and time

## 2024-06-11 NOTE — HISTORY OF PRESENT ILLNESS
[___ Week(s) Ago] : [unfilled] week(s) ago [___] : Performed [unfilled] [de-identified] : 4/24/24 [de-identified] : Reports having RUQ discomfort daily constant non radiating with no increasing or reducing factors. Reports no relation to food position or BM. No other symptoms. Had US done in 2019 for RUQ pain which showed 1.6 cm R lobe indeterminate mass while US in 2024 Jeremias reporting 2 cm L lobe lesion. Had fatty liver for years. Has had elevated liver enzymes on records since 2019.  . Since 2023 AST ALT 40 - 80 range. Had GERD in the past and symptoms are controlled with PPI. On statin since 2013 for CAD. Hx of HTN elevated TG Gout DJD spine Has been having tremors and seen neurology. Was in ER with chest pain  Reports RUQ pain night of biopsy and has resolved.  No jaundice confusion hematemesis or melena. No dysphagia odynophagia No diarrhea constipation.   Max wt 295 lb. Was overweight for more than 5 years. Lost 25 lb in past year with diet changes. Wt stable. [de-identified] : - Mild steatosis. 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  - Mild hemosiderosis (1+) Verified by: Viraj De La Torre, [de-identified] : 1.7 cm hemangioma, hepatic steatosis [FreeTextEntry1] : Pertinent Labs: AST 34 ALT 51 T bili 0.7 crat 0.9 IG A 477 Ig G 1354 Ig M 104 DULCE negative ASMA 1:80 AMA negative Random urine copper 9 A1AT - MM, INR 1.1 Plat 245 Hb 15 Fe sat 32 % TTG Ig A < 1 ceruloplasmi 26 HBSab negative HBcAb total negative HBsAg negative HCV ab negaive Hepatitis A total negative AFP.27 ACE 32, Performed April 2024  Oct 2019   ALP 85 Jan 2023 AST 59 ALT 86 ALP 94 Feb 2023 AST 39 ALT 62 Nov 2023 HBa1c 5.2 TSH 4.4  LDL 89 AST 43 ALT 40 PLt 230  2023 CT coronary angio Diminutive left circumflex artery containing a focal area of severe stenosis proximally with nonvisualization distally, which may be due to occlusion. Multiple focal areas of diminutive density calcified vessel limiting intraluminal evaluation. The total Agatston coronary artery calcium score equals 236, which corresponds to 75th percentile for age, gender and ethnicity. CAD-RADS 4A/5N.  Cardiac cath Coronary Dominance: right LM: minor irregularities LAD: mild disease. 40% ostial D1 CX: severe distal disease, small vessel. 40% OM1 stenosis RCA: mild disease in RPDA. Minor irregularities in remainder   Retired Former  and then  and now on disability Lives with wife Has 2 children Alcohol 4 glasses wine per day Non smoker No drugs  No family hx of liver disease or liver cancer Pertinent Labs: WBC 8 Hb 13.9 PLt 163 AST 39 ALT 50  T michel 0.5, Performed May 2023 Ultrasound: Indeterminate right hepatic lobe mass measuring 1.6 x 1.2 x 1.3 cm. The background liver parenchyma is echogenic, most consistent with hepatic steatosis.  No gallstones or definite acute findings., Performed 2019.  US abdomen Jan 2025 Liver 17.9 cm Hypoechoic lesion 2 cm lesion L lobe

## 2024-06-11 NOTE — ASSESSMENT
[FreeTextEntry1] : 63 y o  M with obesity HTN elevated TG CAD gout tremors seen for steatosis liver disease and liver lesion  Liver lesion - ? hemangioma 2 cm L lobe liver lesion on US Jan 2024. US abdomen 1.6 cm R hepatic lobe lesion. MRI liver  shows L hepatic 1.7 cm lesion consistent with hemangioma.   Elevated transaminases - Met ALD with positive ASMA  Moderate to advanced fibrosis Chronic RUQ pain. New tremors  Obesity BMI 38 HTN elevated TG gout  Had elevation of liver tests on system since 2019 With   improved since With AST ALT in 40 - 80 range. Repeat AST 37 ALT 51 Normal platelet A1AT - MM HBsAg negative HCV ab negative DULCE AMA negative Ig G normal. ASMA 1:80 Ceruloplasmin 26, random copper done by lab instead of 24 hour urine was normal.  US showing steatosis and hepatomegaly Fibroscan -  S3 steatosis LS 5.8 kPA F0 fibrosis Fbrosure A1AT ceruloplasmin HCV autoimmune work up Liver biopsy showed stage  2-3 /4 fibrosis  Treatment Nutrition - low carb high protein / Mediterranean diet Limit red meat Exercise 5 days a week with aerobic activity 30 - 45 minutes 3 day and non aerobic activity like weights, resistance 2 times a week. Advised  alcohol abstinence. Normal liver tests so no Vit E  On medicare so cannot use semaglutide for wt loss Discussed restroom with moderate to advanced fibrosis but wants to wait  Health maintenance HBsAg HCV ab negative Does not have immunity for hepatitis A and B and ordered vaccination but not done yet. Colonoscopy - done at [facility] in last 8 months was normal.

## 2024-06-11 NOTE — REVIEW OF SYSTEMS
[Fever] : no fever [Chills] : no chills [Eye Pain] : no eye pain [Red Eyes] : eyes not red [Earache] : no earache [Loss Of Hearing] : no hearing loss [Chest Pain] : no chest pain [Palpitations] : no palpitations [Cough] : no cough [SOB on Exertion] : no shortness of breath during exertion [As Noted in HPI] : as noted in HPI [Joint Swelling] : no joint swelling [Skin Lesions] : no skin lesions [Confused] : no confusion [Convulsions] : no convulsions [Easy Bleeding] : no tendency for easy bleeding [Easy Bruising] : no tendency for easy bruising

## 2024-12-05 ENCOUNTER — APPOINTMENT (OUTPATIENT)
Dept: GASTROENTEROLOGY | Facility: CLINIC | Age: 64
End: 2024-12-05

## 2024-12-05 VITALS
HEART RATE: 69 BPM | OXYGEN SATURATION: 98 % | SYSTOLIC BLOOD PRESSURE: 132 MMHG | BODY MASS INDEX: 37.25 KG/M2 | WEIGHT: 275 LBS | HEIGHT: 72 IN | DIASTOLIC BLOOD PRESSURE: 74 MMHG

## 2024-12-05 DIAGNOSIS — E78.1 PURE HYPERGLYCERIDEMIA: ICD-10-CM

## 2024-12-05 DIAGNOSIS — K76.0 FATTY (CHANGE OF) LIVER, NOT ELSEWHERE CLASSIFIED: ICD-10-CM

## 2024-12-05 DIAGNOSIS — K74.00 HEPATIC FIBROSIS, UNSPECIFIED: ICD-10-CM

## 2024-12-05 DIAGNOSIS — E66.9 OBESITY, UNSPECIFIED: ICD-10-CM

## 2024-12-05 PROCEDURE — 91200 LIVER ELASTOGRAPHY: CPT

## 2024-12-05 PROCEDURE — 99214 OFFICE O/P EST MOD 30 MIN: CPT | Mod: 25

## 2024-12-25 PROBLEM — F10.90 ALCOHOL USE: Status: ACTIVE | Noted: 2024-03-14

## 2025-01-23 ENCOUNTER — APPOINTMENT (OUTPATIENT)
Dept: GASTROENTEROLOGY | Facility: CLINIC | Age: 65
End: 2025-01-23

## 2025-06-10 ENCOUNTER — NON-APPOINTMENT (OUTPATIENT)
Age: 65
End: 2025-06-10

## 2025-06-12 ENCOUNTER — APPOINTMENT (OUTPATIENT)
Dept: NEUROSURGERY | Facility: CLINIC | Age: 65
End: 2025-06-12
Payer: OTHER MISCELLANEOUS

## 2025-06-12 ENCOUNTER — NON-APPOINTMENT (OUTPATIENT)
Age: 65
End: 2025-06-12

## 2025-06-12 VITALS — BODY MASS INDEX: 37.93 KG/M2 | HEIGHT: 72 IN | WEIGHT: 280 LBS

## 2025-06-12 PROBLEM — Z82.49 FAMILY HISTORY OF HYPERTENSION: Status: ACTIVE | Noted: 2025-06-12

## 2025-06-12 PROCEDURE — 99203 OFFICE O/P NEW LOW 30 MIN: CPT

## 2025-07-24 ENCOUNTER — APPOINTMENT (OUTPATIENT)
Dept: NEUROSURGERY | Facility: CLINIC | Age: 65
End: 2025-07-24
Payer: OTHER MISCELLANEOUS

## 2025-07-24 VITALS — HEIGHT: 72 IN | WEIGHT: 280 LBS | BODY MASS INDEX: 37.93 KG/M2

## 2025-07-24 DIAGNOSIS — M54.16 RADICULOPATHY, LUMBAR REGION: ICD-10-CM

## 2025-07-24 PROCEDURE — 99203 OFFICE O/P NEW LOW 30 MIN: CPT

## 2025-08-27 ENCOUNTER — NON-APPOINTMENT (OUTPATIENT)
Age: 65
End: 2025-08-27